# Patient Record
Sex: MALE | Race: ASIAN | NOT HISPANIC OR LATINO | URBAN - METROPOLITAN AREA
[De-identification: names, ages, dates, MRNs, and addresses within clinical notes are randomized per-mention and may not be internally consistent; named-entity substitution may affect disease eponyms.]

---

## 2018-06-12 ENCOUNTER — OFFICE VISIT (OUTPATIENT)
Dept: ENDOCRINOLOGY | Age: 53
End: 2018-06-12

## 2018-06-12 VITALS
HEIGHT: 71 IN | RESPIRATION RATE: 16 BRPM | DIASTOLIC BLOOD PRESSURE: 70 MMHG | BODY MASS INDEX: 28.03 KG/M2 | WEIGHT: 200.2 LBS | SYSTOLIC BLOOD PRESSURE: 112 MMHG

## 2018-06-12 DIAGNOSIS — I10 ESSENTIAL HYPERTENSION: ICD-10-CM

## 2018-06-12 DIAGNOSIS — E11.9 TYPE 2 DIABETES MELLITUS WITHOUT COMPLICATION, WITHOUT LONG-TERM CURRENT USE OF INSULIN (HCC): Primary | ICD-10-CM

## 2018-06-12 DIAGNOSIS — E78.5 ATHEROGENIC DYSLIPIDEMIA: ICD-10-CM

## 2018-06-12 PROCEDURE — 99204 OFFICE O/P NEW MOD 45 MIN: CPT | Performed by: INTERNAL MEDICINE

## 2018-06-12 RX ORDER — LANCETS 33 GAUGE
EACH MISCELLANEOUS
Qty: 100 EACH | Refills: 5 | Status: SHIPPED | OUTPATIENT
Start: 2018-06-12 | End: 2018-12-14

## 2018-06-12 RX ORDER — FENOFIBRATE 145 MG/1
145 TABLET, COATED ORAL DAILY
Qty: 30 TABLET | Refills: 5 | Status: SHIPPED | OUTPATIENT
Start: 2018-06-12 | End: 2018-12-14

## 2018-06-12 RX ORDER — GLYBURIDE 5 MG/1
TABLET ORAL
Refills: 0 | COMMUNITY
Start: 2018-05-18 | End: 2018-06-13

## 2018-06-12 RX ORDER — PIOGLITAZONEHYDROCHLORIDE 30 MG/1
30 TABLET ORAL DAILY
Qty: 30 TABLET | Refills: 11 | Status: SHIPPED | OUTPATIENT
Start: 2018-06-12 | End: 2018-12-14 | Stop reason: SDUPTHER

## 2018-06-12 RX ORDER — ERTUGLIFLOZIN 15 MG/1
15 TABLET, FILM COATED ORAL EVERY MORNING
Qty: 30 TABLET | Refills: 5 | Status: SHIPPED | OUTPATIENT
Start: 2018-06-12 | End: 2018-09-06

## 2018-06-12 RX ORDER — RAMIPRIL 5 MG/1
CAPSULE ORAL
Refills: 2 | COMMUNITY
Start: 2018-04-04 | End: 2018-12-14 | Stop reason: SDUPTHER

## 2018-06-12 RX ORDER — BLOOD SUGAR DIAGNOSTIC
STRIP MISCELLANEOUS
Qty: 100 EACH | Refills: 5 | Status: SHIPPED | OUTPATIENT
Start: 2018-06-12 | End: 2018-12-18 | Stop reason: SDUPTHER

## 2018-06-12 RX ORDER — SITAGLIPTIN AND METFORMIN HYDROCHLORIDE 1000; 50 MG/1; MG/1
2 TABLET, FILM COATED, EXTENDED RELEASE ORAL
Qty: 60 TABLET | Refills: 5 | Status: SHIPPED | OUTPATIENT
Start: 2018-06-12 | End: 2018-12-14 | Stop reason: SDUPTHER

## 2018-06-12 RX ORDER — ROSUVASTATIN CALCIUM 40 MG/1
40 TABLET, COATED ORAL DAILY
Qty: 30 TABLET | Refills: 11 | Status: SHIPPED | OUTPATIENT
Start: 2018-06-12 | End: 2018-12-14

## 2018-06-12 NOTE — PROGRESS NOTES
"Ketty Sepulveda is a 53 y.o. male seen as a new patient for DM2. He is checking BG every other day. He states that fasting his BG is running 250. He states that he is having burning in his feet x 2 months. He states that he is having back pain while he is sleeping.    History of Present Illness     /70   Resp 16   Ht 180.3 cm (71\")   Wt 90.8 kg (200 lb 3.2 oz)   BMI 27.92 kg/m²      No Known Allergies    Current Outpatient Prescriptions:   •  glyBURIDE (DIAbeta) 5 MG tablet, TK 2 TS PO BID, Disp: , Rfl: 0  •  metFORMIN (GLUCOPHAGE) 1000 MG tablet, TK 1 T PO BID, Disp: , Rfl: 2  •  ramipril (ALTACE) 5 MG capsule, TK 2 CS PO QAM AND 1 C QPM, Disp: , Rfl: 2    The following portions of the patient's history were reviewed and updated as appropriate: allergies, current medications, past family history, past medical history, past social history, past surgical history and problem list.    Review of Systems   Constitutional: Negative.    HENT: Negative.    Eyes: Negative.    Respiratory: Negative.    Cardiovascular: Negative.    Gastrointestinal: Negative.    Endocrine: Negative.    Genitourinary: Negative.    Musculoskeletal: Positive for back pain (lower back pain while sleeping).   Skin: Negative.    Allergic/Immunologic: Negative.    Neurological: Negative.    Hematological: Negative.    Psychiatric/Behavioral: Negative.        Objective   Physical Exam   Constitutional: He is oriented to person, place, and time. He appears well-developed and well-nourished. No distress.   HENT:   Head: Normocephalic and atraumatic.   Right Ear: External ear normal.   Left Ear: External ear normal.   Nose: Nose normal.   Mouth/Throat: Oropharynx is clear and moist.   Eyes: Conjunctivae and EOM are normal. Pupils are equal, round, and reactive to light. Right eye exhibits no discharge. No scleral icterus.   Neck: Normal range of motion. Neck supple. No JVD present. No tracheal deviation present. No thyromegaly " present.   Cardiovascular: Normal rate, regular rhythm, normal heart sounds and intact distal pulses.  Exam reveals no gallop and no friction rub.    No murmur heard.  Pulmonary/Chest: Effort normal and breath sounds normal. No respiratory distress. He has no wheezes. He has no rales. He exhibits no tenderness.   Abdominal: Soft. Bowel sounds are normal. He exhibits no distension and no mass. There is no tenderness. There is no rebound and no guarding. No hernia.   Musculoskeletal: Normal range of motion. He exhibits no edema, tenderness or deformity.   Lymphadenopathy:     He has no cervical adenopathy.   Neurological: He is alert and oriented to person, place, and time. He displays normal reflexes. No cranial nerve deficit or sensory deficit. He exhibits normal muscle tone. Coordination normal.   Skin: Skin is warm and dry. No rash noted. He is not diaphoretic. No erythema. No pallor.   Acanthosis nigricans around the lower part of his face and around his neck more prominent anteriorly.   Psychiatric: He has a normal mood and affect. His behavior is normal. Judgment and thought content normal.   Nursing note and vitals reviewed.        Assessment/Plan   Diagnoses and all orders for this visit:    Type 2 diabetes mellitus without complication, without long-term current use of insulin  -     T3, Free  -     T4 & TSH ([facility])  -     T4, Free  -     Uric Acid  -     Vitamin D 25 Hydroxy  -     Comprehensive Metabolic Panel  -     C-Peptide  -     Hemoglobin A1c  -     Lipid Panel  -     MicroAlbumin, Urine, Random - Urine, Clean Catch  -     ACTH  -     Cortisol  -     T4 & TSH ([facility]); Future  -     Uric Acid; Future  -     Vitamin D 25 Hydroxy; Future  -     Comprehensive Metabolic Panel; Future  -     C-Peptide; Future  -     Hemoglobin A1c; Future  -     Lipid Panel; Future  -     MicroAlbumin, Urine, Random - Urine, Clean Catch; Future    Essential hypertension  -     T3, Free  -     T4 & TSH ([facility])  -      T4, Free  -     Uric Acid  -     Vitamin D 25 Hydroxy  -     Comprehensive Metabolic Panel  -     C-Peptide  -     Hemoglobin A1c  -     Lipid Panel  -     MicroAlbumin, Urine, Random - Urine, Clean Catch  -     ACTH  -     Cortisol  -     T4 & TSH ([facility]); Future  -     Uric Acid; Future  -     Vitamin D 25 Hydroxy; Future  -     Comprehensive Metabolic Panel; Future  -     C-Peptide; Future  -     Hemoglobin A1c; Future  -     Lipid Panel; Future  -     MicroAlbumin, Urine, Random - Urine, Clean Catch; Future    Atherogenic dyslipidemia  -     T3, Free  -     T4 & TSH ([facility])  -     T4, Free  -     Uric Acid  -     Vitamin D 25 Hydroxy  -     Comprehensive Metabolic Panel  -     C-Peptide  -     Hemoglobin A1c  -     Lipid Panel  -     MicroAlbumin, Urine, Random - Urine, Clean Catch  -     ACTH  -     Cortisol  -     T4 & TSH ([facility]); Future  -     Uric Acid; Future  -     Vitamin D 25 Hydroxy; Future  -     Comprehensive Metabolic Panel; Future  -     C-Peptide; Future  -     Hemoglobin A1c; Future  -     Lipid Panel; Future  -     MicroAlbumin, Urine, Random - Urine, Clean Catch; Future    Other orders  -     SCANNED - LABS  -     JANUMET XR  MG tablet; Take 2 tablets by mouth Daily With Dinner.  -     STEGLATRO 15 MG tablet; Take 1 tablet by mouth Every Morning.  -     pioglitazone (ACTOS) 30 MG tablet; Take 1 tablet by mouth Daily.  -     rosuvastatin (CRESTOR) 40 MG tablet; Take 1 tablet by mouth Daily.  -     fenofibrate (TRICOR) 145 MG tablet; Take 1 tablet by mouth Daily.  -     ONETOUCH VERIO test strip; Check blood glucose 3 Times daily Use as instructed  -     ONETOUCH DELICA LANCETS 33G misc; Blood glucose 3 Times daily             in summary I saw and examined this 53-year-old gentleman for above-mentioned problems.  I reviewed these and laboratory evaluations of the 04/04/2018 as well as 10/12/2017 both of which demonstrate hemoglobin A1c of greater than 8.0 indicating poor diabetic  control.  He also has significantly elevated levels of triglyceride and cholesterol as well as LDL.  We will go ahead and order an extensive laboratory evaluation and once the results come back we will go ahead and call for any possible modification or new medications or further evaluations.  At this time however I am is starting him on Janumet 50/1000 mg 2 tablets with supper and Steglatro 5 mg every morning for 30 days and after that move up to 15 mg every morning.  I also started him on Actos 30 mg daily as well as Crestor 40 mg daily and fenofibrate 145 mg daily.  He will see MsChanning Gina Revees in 4 months or sooner if needed with laboratory evaluation prior to each office visit.

## 2018-06-13 LAB
25(OH)D3+25(OH)D2 SERPL-MCNC: 17.1 NG/ML (ref 30–100)
ACTH PLAS-MCNC: 19.1 PG/ML (ref 7.2–63.3)
ALBUMIN SERPL-MCNC: 4.6 G/DL (ref 3.5–5.2)
ALBUMIN/GLOB SERPL: 1.3 G/DL
ALP SERPL-CCNC: 83 U/L (ref 39–117)
ALT SERPL-CCNC: 44 U/L (ref 1–41)
AST SERPL-CCNC: 18 U/L (ref 1–40)
BILIRUB SERPL-MCNC: 0.4 MG/DL (ref 0.1–1.2)
BUN SERPL-MCNC: 12 MG/DL (ref 6–20)
BUN/CREAT SERPL: 15.6 (ref 7–25)
C PEPTIDE SERPL-MCNC: 3.8 NG/ML (ref 1.1–4.4)
CALCIUM SERPL-MCNC: 9.4 MG/DL (ref 8.6–10.5)
CHLORIDE SERPL-SCNC: 96 MMOL/L (ref 98–107)
CHOLEST SERPL-MCNC: 211 MG/DL (ref 0–200)
CO2 SERPL-SCNC: 26.9 MMOL/L (ref 22–29)
CORTIS SERPL-MCNC: 15.5 UG/DL
CREAT SERPL-MCNC: 0.77 MG/DL (ref 0.76–1.27)
GFR SERPLBLD CREATININE-BSD FMLA CKD-EPI: 105 ML/MIN/1.73
GFR SERPLBLD CREATININE-BSD FMLA CKD-EPI: 128 ML/MIN/1.73
GLOBULIN SER CALC-MCNC: 3.5 GM/DL
GLUCOSE SERPL-MCNC: 311 MG/DL (ref 65–99)
HBA1C MFR BLD: 10.7 % (ref 4.8–5.6)
HDLC SERPL-MCNC: 43 MG/DL (ref 40–60)
INTERPRETATION: NORMAL
LDLC SERPL CALC-MCNC: 119 MG/DL (ref 0–100)
Lab: NORMAL
POTASSIUM SERPL-SCNC: 4.5 MMOL/L (ref 3.5–5.2)
PROT SERPL-MCNC: 8.1 G/DL (ref 6–8.5)
SODIUM SERPL-SCNC: 136 MMOL/L (ref 136–145)
T3FREE SERPL-MCNC: 3.6 PG/ML (ref 2–4.4)
T4 FREE SERPL-MCNC: 1.67 NG/DL (ref 0.93–1.7)
T4 SERPL-MCNC: 9.95 MCG/DL (ref 4.5–11.7)
TRIGL SERPL-MCNC: 247 MG/DL (ref 0–150)
TSH SERPL DL<=0.005 MIU/L-ACNC: 4.29 MIU/ML (ref 0.27–4.2)
URATE SERPL-MCNC: 4.4 MG/DL (ref 3.4–7)
VLDLC SERPL CALC-MCNC: 49.4 MG/DL (ref 5–40)

## 2018-06-13 RX ORDER — ERGOCALCIFEROL 1.25 MG/1
50000 CAPSULE ORAL 2 TIMES WEEKLY
Qty: 26 CAPSULE | Refills: 3 | Status: SHIPPED | OUTPATIENT
Start: 2018-06-14 | End: 2018-12-14 | Stop reason: SDUPTHER

## 2018-06-13 RX ORDER — LEVOTHYROXINE SODIUM 75 UG/1
75 TABLET ORAL DAILY
Qty: 30 TABLET | Refills: 5 | Status: SHIPPED | OUTPATIENT
Start: 2018-06-13 | End: 2018-12-14

## 2018-09-06 RX ORDER — DAPAGLIFLOZIN 10 MG/1
TABLET, FILM COATED ORAL
Qty: 30 TABLET | Refills: 5 | Status: SHIPPED | OUTPATIENT
Start: 2018-09-06 | End: 2018-12-14

## 2018-09-07 ENCOUNTER — TELEPHONE (OUTPATIENT)
Dept: ENDOCRINOLOGY | Age: 53
End: 2018-09-07

## 2018-09-07 NOTE — TELEPHONE ENCOUNTER
[facility] AND WAS APPROVED ON 9/10/18

## 2018-09-24 DIAGNOSIS — E11.9 TYPE 2 DIABETES MELLITUS WITHOUT COMPLICATION, WITHOUT LONG-TERM CURRENT USE OF INSULIN (HCC): Primary | ICD-10-CM

## 2018-09-24 DIAGNOSIS — E03.8 SECONDARY HYPOTHYROIDISM: ICD-10-CM

## 2018-09-24 DIAGNOSIS — E78.5 ATHEROGENIC DYSLIPIDEMIA: ICD-10-CM

## 2018-09-24 DIAGNOSIS — E55.9 MILD VITAMIN D DEFICIENCY: ICD-10-CM

## 2018-10-02 ENCOUNTER — LAB (OUTPATIENT)
Dept: ENDOCRINOLOGY | Age: 53
End: 2018-10-02

## 2018-10-02 DIAGNOSIS — E55.9 MILD VITAMIN D DEFICIENCY: ICD-10-CM

## 2018-10-02 DIAGNOSIS — E78.5 ATHEROGENIC DYSLIPIDEMIA: ICD-10-CM

## 2018-10-02 DIAGNOSIS — E03.8 SECONDARY HYPOTHYROIDISM: ICD-10-CM

## 2018-10-02 DIAGNOSIS — E11.9 TYPE 2 DIABETES MELLITUS WITHOUT COMPLICATION, WITHOUT LONG-TERM CURRENT USE OF INSULIN (HCC): ICD-10-CM

## 2018-10-03 ENCOUNTER — RESULTS ENCOUNTER (OUTPATIENT)
Dept: ENDOCRINOLOGY | Age: 53
End: 2018-10-03

## 2018-10-03 DIAGNOSIS — E78.5 ATHEROGENIC DYSLIPIDEMIA: ICD-10-CM

## 2018-10-03 DIAGNOSIS — E11.9 TYPE 2 DIABETES MELLITUS WITHOUT COMPLICATION, WITHOUT LONG-TERM CURRENT USE OF INSULIN (HCC): ICD-10-CM

## 2018-10-03 DIAGNOSIS — I10 ESSENTIAL HYPERTENSION: ICD-10-CM

## 2018-10-03 LAB
25(OH)D3+25(OH)D2 SERPL-MCNC: 23 NG/ML (ref 30–100)
ALBUMIN SERPL-MCNC: 4.6 G/DL (ref 3.5–5.2)
ALBUMIN/GLOB SERPL: 1.4 G/DL
ALP SERPL-CCNC: 71 U/L (ref 39–117)
ALT SERPL-CCNC: 27 U/L (ref 1–41)
AST SERPL-CCNC: 14 U/L (ref 1–40)
BILIRUB SERPL-MCNC: 0.3 MG/DL (ref 0.1–1.2)
BUN SERPL-MCNC: 13 MG/DL (ref 6–20)
BUN/CREAT SERPL: 13.4 (ref 7–25)
C PEPTIDE SERPL-MCNC: 2.2 NG/ML (ref 1.1–4.4)
CALCIUM SERPL-MCNC: 9.6 MG/DL (ref 8.6–10.5)
CHLORIDE SERPL-SCNC: 101 MMOL/L (ref 98–107)
CHOLEST SERPL-MCNC: 229 MG/DL (ref 0–200)
CO2 SERPL-SCNC: 25 MMOL/L (ref 22–29)
CREAT SERPL-MCNC: 0.97 MG/DL (ref 0.76–1.27)
FT4I SERPL CALC-MCNC: 2.2 (ref 1.2–4.9)
GLOBULIN SER CALC-MCNC: 3.4 GM/DL
GLUCOSE SERPL-MCNC: 234 MG/DL (ref 65–99)
HBA1C MFR BLD: 9.5 % (ref 4.8–5.6)
HDLC SERPL-MCNC: 44 MG/DL (ref 40–60)
INTERPRETATION: NORMAL
LDLC SERPL CALC-MCNC: 144 MG/DL (ref 0–100)
Lab: NORMAL
MICROALBUMIN UR-MCNC: 20 UG/ML
POTASSIUM SERPL-SCNC: 4.9 MMOL/L (ref 3.5–5.2)
PROT SERPL-MCNC: 8 G/DL (ref 6–8.5)
SODIUM SERPL-SCNC: 140 MMOL/L (ref 136–145)
T3FREE SERPL-MCNC: 3 PG/ML (ref 2–4.4)
T3RU NFR SERPL: 27 % (ref 24–39)
T4 FREE SERPL-MCNC: 1.38 NG/DL (ref 0.93–1.7)
T4 SERPL-MCNC: 8.3 UG/DL (ref 4.5–12)
TRIGL SERPL-MCNC: 206 MG/DL (ref 0–150)
TSH SERPL DL<=0.005 MIU/L-ACNC: 1.5 UIU/ML (ref 0.45–4.5)
VLDLC SERPL CALC-MCNC: 41.2 MG/DL (ref 5–40)

## 2018-12-01 LAB
25(OH)D3+25(OH)D2 SERPL-MCNC: 20.6 NG/ML (ref 30–100)
ALBUMIN SERPL-MCNC: 4.6 G/DL (ref 3.5–5.2)
ALBUMIN/GLOB SERPL: 1.5 G/DL
ALP SERPL-CCNC: 66 U/L (ref 39–117)
ALT SERPL-CCNC: 31 U/L (ref 1–41)
AST SERPL-CCNC: 21 U/L (ref 1–40)
BILIRUB SERPL-MCNC: 0.3 MG/DL (ref 0.1–1.2)
BUN SERPL-MCNC: 11 MG/DL (ref 6–20)
BUN/CREAT SERPL: 13.8 (ref 7–25)
C PEPTIDE SERPL-MCNC: 2.7 NG/ML (ref 1.1–4.4)
CALCIUM SERPL-MCNC: 9.5 MG/DL (ref 8.6–10.5)
CHLORIDE SERPL-SCNC: 100 MMOL/L (ref 98–107)
CHOLEST SERPL-MCNC: 234 MG/DL (ref 0–200)
CO2 SERPL-SCNC: 22.4 MMOL/L (ref 22–29)
CREAT SERPL-MCNC: 0.8 MG/DL (ref 0.76–1.27)
GLOBULIN SER CALC-MCNC: 3.1 GM/DL
GLUCOSE SERPL-MCNC: 204 MG/DL (ref 65–99)
HBA1C MFR BLD: 9.1 % (ref 4.8–5.6)
HDLC SERPL-MCNC: 45 MG/DL (ref 40–60)
INTERPRETATION: NORMAL
LDLC SERPL CALC-MCNC: 131 MG/DL (ref 0–100)
Lab: NORMAL
POTASSIUM SERPL-SCNC: 4.4 MMOL/L (ref 3.5–5.2)
PROT SERPL-MCNC: 7.7 G/DL (ref 6–8.5)
SODIUM SERPL-SCNC: 137 MMOL/L (ref 136–145)
T4 SERPL-MCNC: 8.71 MCG/DL (ref 4.5–11.7)
TRIGL SERPL-MCNC: 290 MG/DL (ref 0–150)
TSH SERPL DL<=0.005 MIU/L-ACNC: 2.86 MIU/ML (ref 0.27–4.2)
URATE SERPL-MCNC: 4.7 MG/DL (ref 3.4–7)
VLDLC SERPL CALC-MCNC: 58 MG/DL (ref 5–40)

## 2018-12-14 ENCOUNTER — OFFICE VISIT (OUTPATIENT)
Dept: ENDOCRINOLOGY | Age: 53
End: 2018-12-14

## 2018-12-14 VITALS
HEIGHT: 71 IN | DIASTOLIC BLOOD PRESSURE: 72 MMHG | SYSTOLIC BLOOD PRESSURE: 116 MMHG | WEIGHT: 195.4 LBS | HEART RATE: 75 BPM | BODY MASS INDEX: 27.35 KG/M2

## 2018-12-14 DIAGNOSIS — I10 ESSENTIAL HYPERTENSION: ICD-10-CM

## 2018-12-14 DIAGNOSIS — E55.9 VITAMIN D DEFICIENCY: ICD-10-CM

## 2018-12-14 DIAGNOSIS — E11.9 TYPE 2 DIABETES MELLITUS WITHOUT COMPLICATION, WITHOUT LONG-TERM CURRENT USE OF INSULIN (HCC): Primary | ICD-10-CM

## 2018-12-14 DIAGNOSIS — E78.5 ATHEROGENIC DYSLIPIDEMIA: ICD-10-CM

## 2018-12-14 DIAGNOSIS — R68.82 DECREASED LIBIDO: ICD-10-CM

## 2018-12-14 PROCEDURE — 99215 OFFICE O/P EST HI 40 MIN: CPT | Performed by: INTERNAL MEDICINE

## 2018-12-14 RX ORDER — ERTUGLIFLOZIN 15 MG/1
TABLET, FILM COATED ORAL
Qty: 90 TABLET | Refills: 3 | Status: SHIPPED | OUTPATIENT
Start: 2018-12-14 | End: 2019-08-14 | Stop reason: SDDI

## 2018-12-14 RX ORDER — FENOFIBRATE 145 MG/1
145 TABLET, COATED ORAL DAILY
Qty: 90 TABLET | Refills: 3 | Status: SHIPPED | OUTPATIENT
Start: 2018-12-14 | End: 2019-08-14 | Stop reason: SDDI

## 2018-12-14 RX ORDER — SITAGLIPTIN AND METFORMIN HYDROCHLORIDE 1000; 50 MG/1; MG/1
2 TABLET, FILM COATED, EXTENDED RELEASE ORAL
Qty: 180 TABLET | Refills: 3 | Status: SHIPPED | OUTPATIENT
Start: 2018-12-14 | End: 2019-08-14 | Stop reason: SDDI

## 2018-12-14 RX ORDER — NATEGLINIDE 120 MG/1
120 TABLET ORAL
Qty: 270 TABLET | Refills: 3 | Status: SHIPPED | OUTPATIENT
Start: 2018-12-14 | End: 2019-06-17

## 2018-12-14 RX ORDER — ERTUGLIFLOZIN 15 MG/1
TABLET, FILM COATED ORAL
Refills: 5 | COMMUNITY
Start: 2018-11-26 | End: 2018-12-14 | Stop reason: SDUPTHER

## 2018-12-14 RX ORDER — BLOOD-GLUCOSE METER
KIT MISCELLANEOUS
Qty: 200 EACH | Refills: 3 | Status: SHIPPED | OUTPATIENT
Start: 2018-12-14 | End: 2018-12-18 | Stop reason: CLARIF

## 2018-12-14 RX ORDER — RAMIPRIL 5 MG/1
CAPSULE ORAL
Qty: 270 CAPSULE | Refills: 3 | Status: SHIPPED | OUTPATIENT
Start: 2018-12-14 | End: 2019-08-14 | Stop reason: SDDI

## 2018-12-14 RX ORDER — ERGOCALCIFEROL 1.25 MG/1
50000 CAPSULE ORAL 3 TIMES WEEKLY
Qty: 39 CAPSULE | Refills: 3 | Status: SHIPPED | OUTPATIENT
Start: 2018-12-14 | End: 2019-08-14 | Stop reason: SDUPTHER

## 2018-12-14 RX ORDER — BLOOD-GLUCOSE METER
KIT MISCELLANEOUS
Qty: 1 EACH | Refills: 0 | Status: SHIPPED | OUTPATIENT
Start: 2018-12-14 | End: 2018-12-18 | Stop reason: CLARIF

## 2018-12-14 RX ORDER — PIOGLITAZONEHYDROCHLORIDE 30 MG/1
30 TABLET ORAL DAILY
Qty: 90 TABLET | Refills: 3 | Status: SHIPPED | OUTPATIENT
Start: 2018-12-14 | End: 2019-04-02

## 2018-12-14 RX ORDER — FLASH GLUCOSE SENSOR
1 KIT MISCELLANEOUS AS NEEDED
Qty: 6 EACH | Refills: 3 | Status: SHIPPED | OUTPATIENT
Start: 2018-12-14 | End: 2018-12-18 | Stop reason: CLARIF

## 2018-12-14 RX ORDER — LANCETS 28 GAUGE
EACH MISCELLANEOUS
Qty: 200 EACH | Refills: 3 | Status: SHIPPED | OUTPATIENT
Start: 2018-12-14 | End: 2018-12-18 | Stop reason: CLARIF

## 2018-12-14 NOTE — PROGRESS NOTES
"Ketty Sepulveda is a 53 y.o. male     F/u type 2 diabetes.  Lab review.  History of Present Illness this is a 53-year-old gentleman known patient with type II diabetes hypertension and dyslipidemia as well as vitamin D deficiency.  Over the course of last 6 months he has had no significant health problem for which to go to the emergency room or hospital.  And says the reason for stopping fenofibrate and Crestor was that he ran out and had no more refills.  And as he was getting ready to leave the office she mentioned that he does have diminished sexual desire and wanted to be checked for low testosterone.  /72   Pulse 75   Ht 180.3 cm (71\")   Wt 88.6 kg (195 lb 6.4 oz)   BMI 27.25 kg/m²    No Known Allergies     Current Outpatient Medications:   •  ergocalciferol (ERGOCALCIFEROL) 28713 units capsule, Take 1 capsule by mouth 2 (Two) Times a Week., Disp: 26 capsule, Rfl: 3  •  JANUMET XR  MG tablet, Take 2 tablets by mouth Daily With Dinner., Disp: 60 tablet, Rfl: 5  •  ONETOUCH DELICA LANCETS 33G misc, Blood glucose 3 Times daily, Disp: 100 each, Rfl: 5  •  ONETOUCH VERIO test strip, Check blood glucose 3 Times daily Use as instructed, Disp: 100 each, Rfl: 5  •  pioglitazone (ACTOS) 30 MG tablet, Take 1 tablet by mouth Daily. (Patient taking differently: Take 30 mg by mouth As Needed.), Disp: 30 tablet, Rfl: 11  •  ramipril (ALTACE) 5 MG capsule, TK 2 CS PO QAM AND 1 C QPM, Disp: , Rfl: 2  •  STEGLATRO 15 MG tablet, TK 1 T PO QAM, Disp: , Rfl: 5  The following portions of the patient's history were reviewed and updated as appropriate: allergies, current medications, past family history, past medical history, past social history, past surgical history and problem list.    Review of Systems   Constitutional: Negative for chills, fatigue and fever.   Cardiovascular: Positive for palpitations. Negative for chest pain.   Gastrointestinal: Negative for abdominal pain, constipation, diarrhea, " nausea and vomiting.   Endocrine: Positive for cold intolerance. Negative for heat intolerance.       Objective   Physical Exam   Constitutional: He is oriented to person, place, and time. He appears well-developed and well-nourished. No distress.   HENT:   Head: Normocephalic and atraumatic.   Right Ear: External ear normal.   Left Ear: External ear normal.   Nose: Nose normal.   Mouth/Throat: Oropharynx is clear and moist. No oropharyngeal exudate.   Eyes: Conjunctivae and EOM are normal. Pupils are equal, round, and reactive to light. Right eye exhibits no discharge. Left eye exhibits no discharge. No scleral icterus.   Neck: Normal range of motion. Neck supple. No JVD present. No tracheal deviation present. No thyromegaly present.   Cardiovascular: Normal rate, regular rhythm, normal heart sounds and intact distal pulses. Exam reveals no gallop and no friction rub.   No murmur heard.  Pulmonary/Chest: Effort normal and breath sounds normal. No stridor. No respiratory distress. He has no wheezes. He has no rales. He exhibits no tenderness.   Abdominal: Soft. Bowel sounds are normal. He exhibits no distension and no mass. There is no tenderness. There is no rebound and no guarding. No hernia.   Musculoskeletal: Normal range of motion. He exhibits no edema, tenderness or deformity.   Lymphadenopathy:     He has no cervical adenopathy.   Neurological: He is alert and oriented to person, place, and time. He displays normal reflexes. No cranial nerve deficit or sensory deficit. He exhibits normal muscle tone. Coordination normal.   Skin: Skin is warm and dry. No rash noted. He is not diaphoretic. No erythema. No pallor.   Acanthosis nigricans around the lower part of his face and around his neck more prominent anteriorly.   Psychiatric: He has a normal mood and affect. His behavior is normal. Judgment and thought content normal.   Nursing note and vitals reviewed.      Results for orders placed or performed in visit  on 10/03/18   T4 & TSH ([facility])   Result Value Ref Range    TSH 2.860 0.270 - 4.200 mIU/mL    T4, Total 8.71 4.50 - 11.70 mcg/dL   Uric Acid   Result Value Ref Range    Uric Acid 4.7 3.4 - 7.0 mg/dL   Vitamin D 25 Hydroxy   Result Value Ref Range    25 Hydroxy, Vitamin D 20.6 (L) 30.0 - 100.0 ng/ml   Comprehensive Metabolic Panel   Result Value Ref Range    Glucose 204 (H) 65 - 99 mg/dL    BUN 11 6 - 20 mg/dL    Creatinine 0.80 0.76 - 1.27 mg/dL    eGFR Non African Am 101 >60 mL/min/1.73    eGFR African Am 122 >60 mL/min/1.73    BUN/Creatinine Ratio 13.8 7.0 - 25.0    Sodium 137 136 - 145 mmol/L    Potassium 4.4 3.5 - 5.2 mmol/L    Chloride 100 98 - 107 mmol/L    Total CO2 22.4 22.0 - 29.0 mmol/L    Calcium 9.5 8.6 - 10.5 mg/dL    Total Protein 7.7 6.0 - 8.5 g/dL    Albumin 4.60 3.50 - 5.20 g/dL    Globulin 3.1 gm/dL    A/G Ratio 1.5 g/dL    Total Bilirubin 0.3 0.1 - 1.2 mg/dL    Alkaline Phosphatase 66 39 - 117 U/L    AST (SGOT) 21 1 - 40 U/L    ALT (SGPT) 31 1 - 41 U/L   C-Peptide   Result Value Ref Range    C-Peptide 2.7 1.1 - 4.4 ng/mL   Hemoglobin A1c   Result Value Ref Range    Hemoglobin A1C 9.10 (H) 4.80 - 5.60 %   Lipid Panel   Result Value Ref Range    Total Cholesterol 234 (H) 0 - 200 mg/dL    Triglycerides 290 (H) 0 - 150 mg/dL    HDL Cholesterol 45 40 - 60 mg/dL    VLDL Cholesterol 58 (H) 5 - 40 mg/dL    LDL Cholesterol  131 (H) 0 - 100 mg/dL   Cardiovascular Risk Assessment   Result Value Ref Range    Interpretation Note    Diabetes Patient Education   Result Value Ref Range    PDF Image Not applicable      Assessment/Plan   Wyatt was seen today for diabetes.    Diagnoses and all orders for this visit:    Type 2 diabetes mellitus without complication, without long-term current use of insulin (CMS/[facility])  -     T3, Free; Future  -     T4 & TSH ([facility]); Future  -     T4, Free; Future  -     Thyroglobulin With Anti-TG; Future  -     Uric Acid; Future  -     Vitamin D 25 Hydroxy; Future  -      Comprehensive Metabolic Panel; Future  -     C-Peptide; Future  -     Hemoglobin A1c; Future  -     Lipid Panel; Future  -     MicroAlbumin, Urine, Random - Urine, Clean Catch; Future  -     TestT+TestF+SHBG  -     PSA DIAGNOSTIC  -     Hemoglobin & Hematocrit, Blood  -     TestT+TestF+SHBG; Future  -     PSA DIAGNOSTIC; Future  -     Hemoglobin & Hematocrit, Blood; Future    Atherogenic dyslipidemia  -     T3, Free; Future  -     T4 & TSH ([facility]); Future  -     T4, Free; Future  -     Thyroglobulin With Anti-TG; Future  -     Uric Acid; Future  -     Vitamin D 25 Hydroxy; Future  -     Comprehensive Metabolic Panel; Future  -     C-Peptide; Future  -     Hemoglobin A1c; Future  -     Lipid Panel; Future  -     MicroAlbumin, Urine, Random - Urine, Clean Catch; Future  -     TestT+TestF+SHBG  -     PSA DIAGNOSTIC  -     Hemoglobin & Hematocrit, Blood  -     TestT+TestF+SHBG; Future  -     PSA DIAGNOSTIC; Future  -     Hemoglobin & Hematocrit, Blood; Future    Essential hypertension  -     T3, Free; Future  -     T4 & TSH ([facility]); Future  -     T4, Free; Future  -     Thyroglobulin With Anti-TG; Future  -     Uric Acid; Future  -     Vitamin D 25 Hydroxy; Future  -     Comprehensive Metabolic Panel; Future  -     C-Peptide; Future  -     Hemoglobin A1c; Future  -     Lipid Panel; Future  -     MicroAlbumin, Urine, Random - Urine, Clean Catch; Future  -     TestT+TestF+SHBG  -     PSA DIAGNOSTIC  -     Hemoglobin & Hematocrit, Blood  -     TestT+TestF+SHBG; Future  -     PSA DIAGNOSTIC; Future  -     Hemoglobin & Hematocrit, Blood; Future    Vitamin D deficiency  -     T3, Free; Future  -     T4 & TSH ([facility]); Future  -     T4, Free; Future  -     Thyroglobulin With Anti-TG; Future  -     Uric Acid; Future  -     Vitamin D 25 Hydroxy; Future  -     Comprehensive Metabolic Panel; Future  -     C-Peptide; Future  -     Hemoglobin A1c; Future  -     Lipid Panel; Future  -     MicroAlbumin, Urine, Random - Urine,  Clean Catch; Future  -     TestT+TestF+SHBG  -     PSA DIAGNOSTIC  -     Hemoglobin & Hematocrit, Blood  -     TestT+TestF+SHBG; Future  -     PSA DIAGNOSTIC; Future  -     Hemoglobin & Hematocrit, Blood; Future    Decreased libido  -     TestT+TestF+SHBG  -     PSA DIAGNOSTIC  -     Hemoglobin & Hematocrit, Blood  -     TestT+TestF+SHBG; Future  -     PSA DIAGNOSTIC; Future  -     Hemoglobin & Hematocrit, Blood; Future    Other orders  -     fenofibrate (TRICOR) 145 MG tablet; Take 1 tablet by mouth Daily.  -     ramipril (ALTACE) 5 MG capsule; 2 tablets in the morning and one in the evening  -     STEGLATRO 15 MG tablet; Take 1 tablet every morning  -     JANUMET XR  MG tablet; Take 2 tablets by mouth Daily With Dinner.  -     pioglitazone (ACTOS) 30 MG tablet; Take 1 tablet by mouth Daily.  -     nateglinide (STARLIX) 120 MG tablet; Take 1 tablet by mouth 3 (Three) Times a Day Before Meals.  -     ergocalciferol (ERGOCALCIFEROL) 55326 units capsule; Take 1 capsule by mouth 3 (Three) Times a Week.  -     FREESTYLE LITE test strip; Check blood glucose twice daily and when needed  -     Lancets (FREESTYLE) lancets; Check blood glucose twice daily and when needed Use as instructed  -     Continuous Blood Gluc Sensor (FREESTYLE WALTER 14 DAY SENSOR) misc; 1 each As Needed (Use the device at least twice daily or when feeling blood sugar is too high or too low).  -     Blood Glucose Monitoring Suppl (FREESTYLE LITE) device; Use to test blood glucose 2 times and when needed.               In summary I saw and examined this 53-year-old gentleman for above-mentioned problems.  I reviewed his laboratory evaluations of 10/02/2018 as well as 11/30/2013.  His hemoglobin A1c is 9.10 and his lipids showed total cholesterol of 234 with a triglyceride 290 and LDL cholesterol 131 all of which are out of range.  His vitamin D level is also very low.  Vitamin D therapy as well as adding Starlix 120 mg prior to each meal.  I we  will also go ahead and workup he is a decreased libido for any possible testosterone deficiency.  This office visit lasted 45 minutes and 30 minutes of it was a spent on face-to-face patient counseling and education and convincing him about being compliant in taking his medications regularly.  He will see Ms. Gina Reeves in 4 months or sooner if needed with laboratory evaluation prior to each office visit.

## 2018-12-16 LAB
HCT VFR BLD AUTO: 41.3 % (ref 40.4–52.2)
HGB BLD-MCNC: 14.1 G/DL (ref 13.7–17.6)
PSA SERPL-MCNC: 0.87 NG/ML (ref 0–4)
SHBG SERPL-SCNC: 16.8 NMOL/L (ref 19.3–76.4)
TESTOST FREE SERPL-MCNC: 9.5 PG/ML (ref 7.2–24)
TESTOST SERPL-MCNC: 181 NG/DL (ref 264–916)

## 2018-12-18 RX ORDER — BLOOD-GLUCOSE METER
1 EACH MISCELLANEOUS DAILY
Qty: 1 KIT | Refills: 0 | Status: SHIPPED | OUTPATIENT
Start: 2018-12-18

## 2018-12-18 RX ORDER — BLOOD SUGAR DIAGNOSTIC
STRIP MISCELLANEOUS
Qty: 100 EACH | Refills: 5 | Status: SHIPPED | OUTPATIENT
Start: 2018-12-18 | End: 2019-08-14 | Stop reason: SDDI

## 2019-03-22 ENCOUNTER — LAB (OUTPATIENT)
Dept: ENDOCRINOLOGY | Age: 54
End: 2019-03-22

## 2019-03-22 DIAGNOSIS — E78.5 ATHEROGENIC DYSLIPIDEMIA: ICD-10-CM

## 2019-03-22 DIAGNOSIS — E11.9 TYPE 2 DIABETES MELLITUS WITHOUT COMPLICATION, WITHOUT LONG-TERM CURRENT USE OF INSULIN (HCC): ICD-10-CM

## 2019-03-22 DIAGNOSIS — R68.82 DECREASED LIBIDO: ICD-10-CM

## 2019-03-22 DIAGNOSIS — I10 ESSENTIAL HYPERTENSION: ICD-10-CM

## 2019-03-22 DIAGNOSIS — E55.9 VITAMIN D DEFICIENCY: ICD-10-CM

## 2019-03-25 LAB
25(OH)D3+25(OH)D2 SERPL-MCNC: 36.7 NG/ML (ref 30–100)
ALBUMIN SERPL-MCNC: 4.8 G/DL (ref 3.5–5.2)
ALBUMIN/GLOB SERPL: 1.6 G/DL
ALP SERPL-CCNC: 61 U/L (ref 39–117)
ALT SERPL-CCNC: 26 U/L (ref 1–41)
AST SERPL-CCNC: 15 U/L (ref 1–40)
BILIRUB SERPL-MCNC: 0.3 MG/DL (ref 0.2–1.2)
BUN SERPL-MCNC: 16 MG/DL (ref 6–20)
BUN/CREAT SERPL: 15.4 (ref 7–25)
C PEPTIDE SERPL-MCNC: 5.5 NG/ML (ref 1.1–4.4)
CALCIUM SERPL-MCNC: 9.4 MG/DL (ref 8.6–10.5)
CHLORIDE SERPL-SCNC: 96 MMOL/L (ref 98–107)
CHOLEST SERPL-MCNC: 231 MG/DL (ref 0–200)
CO2 SERPL-SCNC: 21.6 MMOL/L (ref 22–29)
CREAT SERPL-MCNC: 1.04 MG/DL (ref 0.76–1.27)
GLOBULIN SER CALC-MCNC: 3 GM/DL
GLUCOSE SERPL-MCNC: 237 MG/DL (ref 65–99)
HBA1C MFR BLD: 9.54 % (ref 4.8–5.6)
HCT VFR BLD AUTO: 43.7 % (ref 37.5–51)
HDLC SERPL-MCNC: 49 MG/DL (ref 40–60)
HGB BLD-MCNC: 13.9 G/DL (ref 13–17.7)
INTERPRETATION: NORMAL
LDLC SERPL CALC-MCNC: 141 MG/DL (ref 0–100)
Lab: NORMAL
MICROALBUMIN UR-MCNC: 12.1 UG/ML
POTASSIUM SERPL-SCNC: 4.5 MMOL/L (ref 3.5–5.2)
PROT SERPL-MCNC: 7.8 G/DL (ref 6–8.5)
PSA SERPL-MCNC: 0.75 NG/ML (ref 0–4)
SHBG SERPL-SCNC: 20.8 NMOL/L (ref 19.3–76.4)
SODIUM SERPL-SCNC: 133 MMOL/L (ref 136–145)
T3FREE SERPL-MCNC: 2.7 PG/ML (ref 2–4.4)
T4 FREE SERPL-MCNC: 1.5 NG/DL (ref 0.93–1.7)
T4 SERPL-MCNC: 9.11 MCG/DL (ref 4.5–11.7)
TESTOST FREE SERPL-MCNC: 6 PG/ML (ref 7.2–24)
TESTOST SERPL-MCNC: 165 NG/DL (ref 264–916)
THYROGLOB AB SERPL-ACNC: <1 IU/ML (ref 0–0.9)
THYROGLOB SERPL-MCNC: 6.9 NG/ML (ref 1.4–29.2)
TRIGL SERPL-MCNC: 203 MG/DL (ref 0–150)
TSH SERPL DL<=0.005 MIU/L-ACNC: 1.15 MIU/ML (ref 0.27–4.2)
URATE SERPL-MCNC: 4.4 MG/DL (ref 3.4–7)
VLDLC SERPL CALC-MCNC: 40.6 MG/DL (ref 5–40)

## 2019-04-02 ENCOUNTER — OFFICE VISIT (OUTPATIENT)
Dept: ENDOCRINOLOGY | Age: 54
End: 2019-04-02

## 2019-04-02 VITALS
SYSTOLIC BLOOD PRESSURE: 126 MMHG | WEIGHT: 194.6 LBS | DIASTOLIC BLOOD PRESSURE: 66 MMHG | BODY MASS INDEX: 27.24 KG/M2 | HEIGHT: 71 IN | RESPIRATION RATE: 16 BRPM

## 2019-04-02 DIAGNOSIS — R68.82 DECREASED LIBIDO: ICD-10-CM

## 2019-04-02 DIAGNOSIS — E55.9 VITAMIN D DEFICIENCY: ICD-10-CM

## 2019-04-02 DIAGNOSIS — I10 ESSENTIAL HYPERTENSION: ICD-10-CM

## 2019-04-02 DIAGNOSIS — E78.5 ATHEROGENIC DYSLIPIDEMIA: ICD-10-CM

## 2019-04-02 DIAGNOSIS — R79.89 LOW TESTOSTERONE: ICD-10-CM

## 2019-04-02 DIAGNOSIS — E11.9 TYPE 2 DIABETES MELLITUS WITHOUT COMPLICATION, WITHOUT LONG-TERM CURRENT USE OF INSULIN (HCC): Primary | ICD-10-CM

## 2019-04-02 PROCEDURE — 99214 OFFICE O/P EST MOD 30 MIN: CPT | Performed by: INTERNAL MEDICINE

## 2019-04-02 RX ORDER — TESTOSTERONE GEL, 1% 10 MG/G
100 GEL TRANSDERMAL DAILY
Qty: 60 TUBE | Refills: 5 | Status: SHIPPED | OUTPATIENT
Start: 2019-04-02 | End: 2019-04-11

## 2019-04-02 RX ORDER — ROSUVASTATIN CALCIUM 20 MG/1
20 TABLET, COATED ORAL NIGHTLY
Qty: 30 TABLET | Refills: 11 | Status: SHIPPED | OUTPATIENT
Start: 2019-04-02 | End: 2019-08-14 | Stop reason: SDDI

## 2019-04-02 RX ORDER — PIOGLITAZONEHYDROCHLORIDE 45 MG/1
45 TABLET ORAL DAILY
Qty: 90 TABLET | Refills: 3 | Status: SHIPPED | OUTPATIENT
Start: 2019-04-02 | End: 2019-08-14 | Stop reason: SDDI

## 2019-04-02 NOTE — PROGRESS NOTES
"Subjective   Wyatt Sepulveda is a 54 y.o. male seen for follow up for DM2, lab review. Patient states that in his hand he is feeling pain in his bones and numbness in his fingers. He is not checking his BG. He states that he has checked his BG around lunch and BG is running in the 130's.     History of Present Illness this is a 54-year-old gentleman known patient with type 2 diabetes and hypertension and vitamin D deficiency with dyslipidemia.  Over the course of last 6 months he has had no significant health problems for which to go to the ER or hospital.    /66   Resp 16   Ht 180.3 cm (71\")   Wt 88.3 kg (194 lb 9.6 oz)   BMI 27.14 kg/m²      No Known Allergies    Current Outpatient Medications:   •  Blood Glucose Monitoring Suppl (ONETOUCH VERIO FLEX SYSTEM) w/Device kit, 1 Device Daily., Disp: 1 kit, Rfl: 0  •  ergocalciferol (ERGOCALCIFEROL) 48375 units capsule, Take 1 capsule by mouth 3 (Three) Times a Week., Disp: 39 capsule, Rfl: 3  •  fenofibrate (TRICOR) 145 MG tablet, Take 1 tablet by mouth Daily., Disp: 90 tablet, Rfl: 3  •  JANUMET XR  MG tablet, Take 2 tablets by mouth Daily With Dinner., Disp: 180 tablet, Rfl: 3  •  nateglinide (STARLIX) 120 MG tablet, Take 1 tablet by mouth 3 (Three) Times a Day Before Meals., Disp: 270 tablet, Rfl: 3  •  ONETOUCH VERIO test strip, Check blood glucose 3 Times daily Use as instructed, Disp: 100 each, Rfl: 5  •  pioglitazone (ACTOS) 30 MG tablet, Take 1 tablet by mouth Daily., Disp: 90 tablet, Rfl: 3  •  ramipril (ALTACE) 5 MG capsule, 2 tablets in the morning and one in the evening, Disp: 270 capsule, Rfl: 3  •  STEGLATRO 15 MG tablet, Take 1 tablet every morning, Disp: 90 tablet, Rfl: 3      The following portions of the patient's history were reviewed and updated as appropriate: allergies, current medications, past family history, past medical history, past social history, past surgical history and problem list.    Review of Systems   Constitutional: " Negative.    HENT: Negative.    Eyes: Negative.    Respiratory: Negative.    Cardiovascular: Negative.    Gastrointestinal: Negative.    Endocrine: Negative.    Genitourinary: Negative.    Musculoskeletal: Negative.    Skin: Negative.    Allergic/Immunologic: Negative.    Neurological: Negative.    Hematological: Negative.    Psychiatric/Behavioral: Negative.        Objective   Physical Exam   Constitutional: He is oriented to person, place, and time. He appears well-developed and well-nourished. No distress.   HENT:   Head: Normocephalic and atraumatic.   Right Ear: External ear normal.   Left Ear: External ear normal.   Nose: Nose normal.   Mouth/Throat: Oropharynx is clear and moist. No oropharyngeal exudate.   Eyes: Conjunctivae and EOM are normal. Pupils are equal, round, and reactive to light. Right eye exhibits no discharge. Left eye exhibits no discharge. No scleral icterus.   Neck: Normal range of motion. Neck supple. No JVD present. No tracheal deviation present. No thyromegaly present.   Cardiovascular: Normal rate, regular rhythm, normal heart sounds and intact distal pulses. Exam reveals no gallop and no friction rub.   No murmur heard.  Pulmonary/Chest: Effort normal and breath sounds normal. No stridor. No respiratory distress. He has no wheezes. He has no rales. He exhibits no tenderness.   Abdominal: Soft. Bowel sounds are normal. He exhibits no distension and no mass. There is no tenderness. There is no rebound and no guarding. No hernia.   Musculoskeletal: Normal range of motion. He exhibits no edema, tenderness or deformity.   Lymphadenopathy:     He has no cervical adenopathy.   Neurological: He is alert and oriented to person, place, and time. He displays normal reflexes. No cranial nerve deficit or sensory deficit. He exhibits normal muscle tone. Coordination normal.   Skin: Skin is warm and dry. No rash noted. He is not diaphoretic. No erythema. No pallor.   Acanthosis nigricans around the  lower part of his face and around his neck more prominent anteriorly.   Psychiatric: He has a normal mood and affect. His behavior is normal. Judgment and thought content normal.   Nursing note and vitals reviewed.       Results for orders placed or performed in visit on 03/22/19   PSA DIAGNOSTIC   Result Value Ref Range    PSA 0.749 0.000 - 4.000 ng/mL   Hemoglobin & Hematocrit, Blood   Result Value Ref Range    Hemoglobin 13.9 13.0 - 17.7 g/dL    Hematocrit 43.7 37.5 - 51.0 %   TestT+TestF+SHBG   Result Value Ref Range    Testosterone, Total 165 (L) 264 - 916 ng/dL    Testosterone, Free 6.0 (L) 7.2 - 24.0 pg/mL    Sex Hormone Binding Globulin 20.8 19.3 - 76.4 nmol/L   MicroAlbumin, Urine, Random - Urine, Clean Catch   Result Value Ref Range    Microalbumin, Urine 12.1 Not Estab. ug/mL   Lipid Panel   Result Value Ref Range    Total Cholesterol 231 (H) 0 - 200 mg/dL    Triglycerides 203 (H) 0 - 150 mg/dL    HDL Cholesterol 49 40 - 60 mg/dL    VLDL Cholesterol 40.6 (H) 5 - 40 mg/dL    LDL Cholesterol  141 (H) 0 - 100 mg/dL   Hemoglobin A1c   Result Value Ref Range    Hemoglobin A1C 9.54 (H) 4.80 - 5.60 %   C-Peptide   Result Value Ref Range    C-Peptide 5.5 (H) 1.1 - 4.4 ng/mL   Comprehensive Metabolic Panel   Result Value Ref Range    Glucose 237 (H) 65 - 99 mg/dL    BUN 16 6 - 20 mg/dL    Creatinine 1.04 0.76 - 1.27 mg/dL    eGFR Non African Am 74 >60 mL/min/1.73    eGFR African Am 90 >60 mL/min/1.73    BUN/Creatinine Ratio 15.4 7.0 - 25.0    Sodium 133 (L) 136 - 145 mmol/L    Potassium 4.5 3.5 - 5.2 mmol/L    Chloride 96 (L) 98 - 107 mmol/L    Total CO2 21.6 (L) 22.0 - 29.0 mmol/L    Calcium 9.4 8.6 - 10.5 mg/dL    Total Protein 7.8 6.0 - 8.5 g/dL    Albumin 4.80 3.50 - 5.20 g/dL    Globulin 3.0 gm/dL    A/G Ratio 1.6 g/dL    Total Bilirubin 0.3 0.2 - 1.2 mg/dL    Alkaline Phosphatase 61 39 - 117 U/L    AST (SGOT) 15 1 - 40 U/L    ALT (SGPT) 26 1 - 41 U/L   Vitamin D 25 Hydroxy   Result Value Ref Range    25  Hydroxy, Vitamin D 36.7 30.0 - 100.0 ng/ml   Uric Acid   Result Value Ref Range    Uric Acid 4.4 3.4 - 7.0 mg/dL   Thyroglobulin With Anti-TG   Result Value Ref Range    Thyroglobulin Ab <1.0 0.0 - 0.9 IU/mL   T4, Free   Result Value Ref Range    Free T4 1.50 0.93 - 1.70 ng/dL   T4 & TSH ([facility])   Result Value Ref Range    TSH 1.150 0.270 - 4.200 mIU/mL    T4, Total 9.11 4.50 - 11.70 mcg/dL   T3, Free   Result Value Ref Range    T3, Free 2.7 2.0 - 4.4 pg/mL   Thyroglobulin By KRISTA   Result Value Ref Range    THYROGLOBULIN BY KRISTA 6.9 1.4 - 29.2 ng/mL   Cardiovascular Risk Assessment   Result Value Ref Range    Interpretation Note    Diabetes Patient Education   Result Value Ref Range    PDF Image Not applicable          Assessment/Plan   Diagnoses and all orders for this visit:    Type 2 diabetes mellitus without complication, without long-term current use of insulin (CMS/[facility])  -     T4 & TSH ([facility]); Future  -     TestT+TestF+SHBG; Future  -     Uric Acid; Future  -     Vitamin D 25 Hydroxy; Future  -     Comprehensive Metabolic Panel; Future  -     C-Peptide; Future  -     Hemoglobin A1c; Future  -     Lipid Panel; Future  -     PSA DIAGNOSTIC; Future  -     Hemoglobin & Hematocrit, Blood; Future    Essential hypertension  -     T4 & TSH ([facility]); Future  -     TestT+TestF+SHBG; Future  -     Uric Acid; Future  -     Vitamin D 25 Hydroxy; Future  -     Comprehensive Metabolic Panel; Future  -     C-Peptide; Future  -     Hemoglobin A1c; Future  -     Lipid Panel; Future  -     PSA DIAGNOSTIC; Future  -     Hemoglobin & Hematocrit, Blood; Future    Vitamin D deficiency  -     T4 & TSH ([facility]); Future  -     TestT+TestF+SHBG; Future  -     Uric Acid; Future  -     Vitamin D 25 Hydroxy; Future  -     Comprehensive Metabolic Panel; Future  -     C-Peptide; Future  -     Hemoglobin A1c; Future  -     Lipid Panel; Future  -     PSA DIAGNOSTIC; Future  -     Hemoglobin & Hematocrit, Blood; Future    Atherogenic  dyslipidemia  -     T4 & TSH ([facility]); Future  -     TestT+TestF+SHBG; Future  -     Uric Acid; Future  -     Vitamin D 25 Hydroxy; Future  -     Comprehensive Metabolic Panel; Future  -     C-Peptide; Future  -     Hemoglobin A1c; Future  -     Lipid Panel; Future  -     PSA DIAGNOSTIC; Future  -     Hemoglobin & Hematocrit, Blood; Future    Decreased libido  -     T4 & TSH ([facility]); Future  -     TestT+TestF+SHBG; Future  -     Uric Acid; Future  -     Vitamin D 25 Hydroxy; Future  -     Comprehensive Metabolic Panel; Future  -     C-Peptide; Future  -     Hemoglobin A1c; Future  -     Lipid Panel; Future  -     PSA DIAGNOSTIC; Future  -     Hemoglobin & Hematocrit, Blood; Future    Low testosterone  -     T4 & TSH ([facility]); Future  -     TestT+TestF+SHBG; Future  -     Uric Acid; Future  -     Vitamin D 25 Hydroxy; Future  -     Comprehensive Metabolic Panel; Future  -     C-Peptide; Future  -     Hemoglobin A1c; Future  -     Lipid Panel; Future  -     PSA DIAGNOSTIC; Future  -     Hemoglobin & Hematocrit, Blood; Future    Other orders  -     rosuvastatin (CRESTOR) 20 MG tablet; Take 1 tablet by mouth Every Night.  -     testosterone (ANDROGEL) 50 MG/5GM (1%) gel gel; Place 100 mg on the skin as directed by provider Daily.  -     pioglitazone (ACTOS) 45 MG tablet; Take 1 tablet by mouth Daily.          In summary I saw and examined this 54-year-old gentleman for above-mentioned problems.  I reviewed his laboratory evaluation of March 22, 2019 and provided him with a hard copy of it.  He does have hypogonadism by virtue of having low testosterone as well as rolled free testosterone.  Also his total cholesterol and LDL level are elevated and because of this I am starting him on Crestor 20 mg daily.  I would like to start him on basal insulin however he ask for little more time to do his best with diet and exercise and see if he could get he is A1c below 7.  He will continue rest of his prescriptions and I will  see him in 4 months or sooner if needed with laboratory evaluation prior to each office visit.

## 2019-04-11 RX ORDER — TESTOSTERONE CYPIONATE 200 MG/ML
200 INJECTION, SOLUTION INTRAMUSCULAR
Qty: 3 ML | Refills: 5 | Status: SHIPPED | OUTPATIENT
Start: 2019-04-11 | End: 2019-08-14 | Stop reason: SDDI

## 2019-04-11 RX ORDER — TESTOSTERONE CYPIONATE 200 MG/ML
200 INJECTION, SOLUTION INTRAMUSCULAR
Qty: 3 ML | Refills: 5 | Status: SHIPPED | OUTPATIENT
Start: 2019-04-11 | End: 2019-04-11 | Stop reason: SDUPTHER

## 2019-06-17 RX ORDER — INSULIN GLARGINE AND LIXISENATIDE 100; 33 U/ML; UG/ML
60 INJECTION, SOLUTION SUBCUTANEOUS
Qty: 5 PEN | Refills: 5 | Status: SHIPPED | OUTPATIENT
Start: 2019-06-17 | End: 2019-06-19

## 2019-06-18 ENCOUNTER — TELEPHONE (OUTPATIENT)
Dept: ENDOCRINOLOGY | Age: 54
End: 2019-06-18

## 2019-06-18 NOTE — TELEPHONE ENCOUNTER
----- Message from Loretta Morillo MD sent at 6/17/2019  4:52 PM EDT -----  I am starting him on 16 units every morning and every 4 days if his fasting blood glucose is greater than 110 increase the dose by 4 units up to 60 units.  He is welcome to stop by at the office to  a sample and a co-pay reduction card.  He does not need to take Starlix any longer.  ----- Message -----  From: Zohreh Nair MA  Sent: 6/17/2019   1:21 PM  To: Loretta Morillo MD        ----- Message -----  From: Chelo Croft MA  Sent: 6/17/2019  12:35 PM  To: Zohreh Nair MA    Pt called this afternoon stating that he originally did not want to go on insulin but his bg is continously staying in the range of 190-210. He is wanting to go on insulin now to control it. He could not provide a blood glucose log.    Please call: [phone], you are able to leave a message.    Spoke to patient. He expressed understanding. He will come and  samples.

## 2019-06-18 NOTE — TELEPHONE ENCOUNTER
Pt has a question regarding his new rx for SOLIQUA 100-33 UNT-MCG/ML solution pen-injector. He is wondering if he needs to stop any of his other meds. Pt can be contacted at [phone].

## 2019-06-19 ENCOUNTER — TELEPHONE (OUTPATIENT)
Dept: ENDOCRINOLOGY | Age: 54
End: 2019-06-19

## 2019-06-19 RX ORDER — EXENATIDE 2 MG/.85ML
2 INJECTION, SUSPENSION, EXTENDED RELEASE SUBCUTANEOUS WEEKLY
Qty: 12 PEN | Refills: 3 | Status: SHIPPED | OUTPATIENT
Start: 2019-06-19 | End: 2019-08-14 | Stop reason: SDDI

## 2019-06-19 RX ORDER — INSULIN GLARGINE 100 [IU]/ML
INJECTION, SOLUTION SUBCUTANEOUS
Qty: 18 PEN | Refills: 3 | Status: SHIPPED | OUTPATIENT
Start: 2019-06-19 | End: 2019-08-14 | Stop reason: SDUPTHER

## 2019-06-19 NOTE — TELEPHONE ENCOUNTER
----- Message from Loretta Morillo MD sent at 6/19/2019  9:37 AM EDT -----  I just received a message from his health insurance company that this is no longer on their formulary.  I therefore have switched him to Bydureon on 2 mg once a week and basic lar same number of units he is taking Soliqua.  ----- Message -----  From: Zohreh Nair MA  Sent: 6/19/2019   8:18 AM  To: Loretta Morillo MD    Pt has a question regarding his new rx for SOLIQUA 100-33 UNT-MCG/ML solution pen-injector. He is wondering if he needs to stop any of his other meds. Pt can be contacted at [phone].     Spoke to patient. He states that he is at work and will call back to discuss medications.

## 2019-06-20 ENCOUNTER — PRIOR AUTHORIZATION (OUTPATIENT)
Dept: ENDOCRINOLOGY | Age: 54
End: 2019-06-20

## 2019-07-22 ENCOUNTER — RESULTS ENCOUNTER (OUTPATIENT)
Dept: ENDOCRINOLOGY | Age: 54
End: 2019-07-22

## 2019-07-22 DIAGNOSIS — E11.9 TYPE 2 DIABETES MELLITUS WITHOUT COMPLICATION, WITHOUT LONG-TERM CURRENT USE OF INSULIN (HCC): ICD-10-CM

## 2019-07-22 DIAGNOSIS — E55.9 VITAMIN D DEFICIENCY: ICD-10-CM

## 2019-07-22 DIAGNOSIS — R79.89 LOW TESTOSTERONE: ICD-10-CM

## 2019-07-22 DIAGNOSIS — R68.82 DECREASED LIBIDO: ICD-10-CM

## 2019-07-22 DIAGNOSIS — I10 ESSENTIAL HYPERTENSION: ICD-10-CM

## 2019-07-22 DIAGNOSIS — E78.5 ATHEROGENIC DYSLIPIDEMIA: ICD-10-CM

## 2019-08-01 LAB
25(OH)D3+25(OH)D2 SERPL-MCNC: 30.6 NG/ML (ref 30–100)
ALBUMIN SERPL-MCNC: 4.3 G/DL (ref 3.5–5.2)
ALBUMIN/GLOB SERPL: 1.3 G/DL
ALP SERPL-CCNC: 88 U/L (ref 39–117)
ALT SERPL-CCNC: 27 U/L (ref 1–41)
AST SERPL-CCNC: 17 U/L (ref 1–40)
BILIRUB SERPL-MCNC: 0.3 MG/DL (ref 0.2–1.2)
BUN SERPL-MCNC: 10 MG/DL (ref 6–20)
BUN/CREAT SERPL: 12.7 (ref 7–25)
C PEPTIDE SERPL-MCNC: 3.7 NG/ML (ref 1.1–4.4)
CALCIUM SERPL-MCNC: 9 MG/DL (ref 8.6–10.5)
CHLORIDE SERPL-SCNC: 95 MMOL/L (ref 98–107)
CHOLEST SERPL-MCNC: 230 MG/DL (ref 0–200)
CO2 SERPL-SCNC: 24.6 MMOL/L (ref 22–29)
CREAT SERPL-MCNC: 0.79 MG/DL (ref 0.76–1.27)
GLOBULIN SER CALC-MCNC: 3.4 GM/DL
GLUCOSE SERPL-MCNC: 414 MG/DL (ref 65–99)
HBA1C MFR BLD: 10.1 % (ref 4.8–5.6)
HCT VFR BLD AUTO: 43.2 % (ref 37.5–51)
HDLC SERPL-MCNC: 40 MG/DL (ref 40–60)
HGB BLD-MCNC: 13.9 G/DL (ref 13–17.7)
INTERPRETATION: NORMAL
LDLC SERPL CALC-MCNC: ABNORMAL MG/DL
Lab: NORMAL
POTASSIUM SERPL-SCNC: 4.4 MMOL/L (ref 3.5–5.2)
PROT SERPL-MCNC: 7.7 G/DL (ref 6–8.5)
PSA SERPL-MCNC: 2.68 NG/ML (ref 0–4)
SHBG SERPL-SCNC: 21.4 NMOL/L (ref 19.3–76.4)
SODIUM SERPL-SCNC: 134 MMOL/L (ref 136–145)
T4 SERPL-MCNC: 7.91 MCG/DL (ref 4.5–11.7)
TESTOST FREE SERPL-MCNC: 8.4 PG/ML (ref 7.2–24)
TESTOST SERPL-MCNC: 168 NG/DL (ref 264–916)
TRIGL SERPL-MCNC: 600 MG/DL (ref 0–150)
TSH SERPL DL<=0.005 MIU/L-ACNC: 1.05 MIU/ML (ref 0.27–4.2)
URATE SERPL-MCNC: 4 MG/DL (ref 3.4–7)
VLDLC SERPL CALC-MCNC: ABNORMAL MG/DL

## 2019-08-14 ENCOUNTER — OFFICE VISIT (OUTPATIENT)
Dept: ENDOCRINOLOGY | Age: 54
End: 2019-08-14

## 2019-08-14 VITALS
WEIGHT: 193.8 LBS | BODY MASS INDEX: 27.13 KG/M2 | RESPIRATION RATE: 16 BRPM | SYSTOLIC BLOOD PRESSURE: 126 MMHG | HEIGHT: 71 IN | DIASTOLIC BLOOD PRESSURE: 82 MMHG

## 2019-08-14 DIAGNOSIS — I10 ESSENTIAL HYPERTENSION: ICD-10-CM

## 2019-08-14 DIAGNOSIS — E78.5 ATHEROGENIC DYSLIPIDEMIA: ICD-10-CM

## 2019-08-14 DIAGNOSIS — R79.89 LOW TESTOSTERONE: ICD-10-CM

## 2019-08-14 DIAGNOSIS — E55.9 VITAMIN D DEFICIENCY: ICD-10-CM

## 2019-08-14 DIAGNOSIS — E11.9 TYPE 2 DIABETES MELLITUS WITHOUT COMPLICATION, WITHOUT LONG-TERM CURRENT USE OF INSULIN (HCC): Primary | ICD-10-CM

## 2019-08-14 PROCEDURE — 99215 OFFICE O/P EST HI 40 MIN: CPT | Performed by: INTERNAL MEDICINE

## 2019-08-14 RX ORDER — ERTUGLIFLOZIN 15 MG/1
TABLET, FILM COATED ORAL
Qty: 90 TABLET | Refills: 3 | Status: SHIPPED | OUTPATIENT
Start: 2019-08-14 | End: 2019-12-03 | Stop reason: SDUPTHER

## 2019-08-14 RX ORDER — ICOSAPENT ETHYL 1000 MG/1
2 CAPSULE ORAL 2 TIMES DAILY WITH MEALS
Qty: 360 CAPSULE | Refills: 3 | Status: SHIPPED | OUTPATIENT
Start: 2019-08-14 | End: 2019-08-20

## 2019-08-14 RX ORDER — PIOGLITAZONEHYDROCHLORIDE 45 MG/1
45 TABLET ORAL DAILY
Qty: 90 TABLET | Refills: 3 | Status: SHIPPED | OUTPATIENT
Start: 2019-08-14 | End: 2019-12-03 | Stop reason: SDUPTHER

## 2019-08-14 RX ORDER — INSULIN GLARGINE 100 [IU]/ML
INJECTION, SOLUTION SUBCUTANEOUS
Qty: 18 PEN | Refills: 3 | Status: SHIPPED | OUTPATIENT
Start: 2019-08-14 | End: 2019-12-03 | Stop reason: SDUPTHER

## 2019-08-14 RX ORDER — SITAGLIPTIN AND METFORMIN HYDROCHLORIDE 1000; 50 MG/1; MG/1
2 TABLET, FILM COATED, EXTENDED RELEASE ORAL
Qty: 180 TABLET | Refills: 3 | Status: SHIPPED | OUTPATIENT
Start: 2019-08-14 | End: 2019-12-03 | Stop reason: SDUPTHER

## 2019-08-14 RX ORDER — ROSUVASTATIN CALCIUM 40 MG/1
40 TABLET, COATED ORAL DAILY
Qty: 90 TABLET | Refills: 3 | Status: SHIPPED | OUTPATIENT
Start: 2019-08-14 | End: 2019-12-03 | Stop reason: SDUPTHER

## 2019-08-14 RX ORDER — ERGOCALCIFEROL 1.25 MG/1
50000 CAPSULE ORAL 3 TIMES WEEKLY
Qty: 39 CAPSULE | Refills: 3 | Status: SHIPPED | OUTPATIENT
Start: 2019-08-14 | End: 2019-12-03 | Stop reason: SDUPTHER

## 2019-08-14 RX ORDER — RAMIPRIL 5 MG/1
CAPSULE ORAL
Qty: 270 CAPSULE | Refills: 3 | Status: SHIPPED | OUTPATIENT
Start: 2019-08-14

## 2019-08-14 NOTE — PROGRESS NOTES
"Subjective   Wyatt Speulveda is a 54 y.o. male seen for follow up for DM2, lab review. Patient states that he is only taking his insulin. He is checking BG only as needed. He denies any problems or concerns.     History of Present Illness this is a 54-year-old gentleman known patient with type 2 diabetes hypertension and dyslipidemia as well as vitamin D deficiency.  Over the course of last 4 months he has had no significant health problem for which to go to the emergency room or hospital.  On his last office visit he was a started on multiple medications to address multiple metabolic issues and he has a stopped them all because someone had told him he only needs to take insulin.    /82   Resp 16   Ht 180.3 cm (71\")   Wt 87.9 kg (193 lb 12.8 oz)   BMI 27.03 kg/m²      No Known Allergies    Current Outpatient Medications:   •  Blood Glucose Monitoring Suppl (ONETOUCH VERIO FLEX SYSTEM) w/Device kit, 1 Device Daily., Disp: 1 kit, Rfl: 0  •  ergocalciferol (ERGOCALCIFEROL) 58685 units capsule, Take 1 capsule by mouth 3 (Three) Times a Week., Disp: 39 capsule, Rfl: 3  •  Insulin Glargine (BASAGLAR KWIKPEN) 100 UNIT/ML injection pen, See pharmacy instructions, Disp: 18 pen, Rfl: 3      The following portions of the patient's history were reviewed and updated as appropriate: allergies, current medications, past family history, past medical history, past social history, past surgical history and problem list.    Review of Systems    Objective   Physical Exam   Constitutional: He is oriented to person, place, and time. He appears well-developed and well-nourished. No distress.   HENT:   Head: Normocephalic and atraumatic.   Right Ear: External ear normal.   Left Ear: External ear normal.   Nose: Nose normal.   Mouth/Throat: Oropharynx is clear and moist. No oropharyngeal exudate.   Eyes: Conjunctivae and EOM are normal. Pupils are equal, round, and reactive to light. Right eye exhibits no discharge. Left eye " exhibits no discharge. No scleral icterus.   Neck: Normal range of motion. Neck supple. No JVD present. No tracheal deviation present. No thyromegaly present.   Cardiovascular: Normal rate, regular rhythm, normal heart sounds and intact distal pulses. Exam reveals no gallop and no friction rub.   No murmur heard.  Pulmonary/Chest: Effort normal and breath sounds normal. No stridor. No respiratory distress. He has no wheezes. He has no rales. He exhibits no tenderness.   Abdominal: Soft. Bowel sounds are normal. He exhibits no distension and no mass. There is no tenderness. There is no rebound and no guarding. No hernia.   Musculoskeletal: Normal range of motion. He exhibits no edema, tenderness or deformity.   Lymphadenopathy:     He has no cervical adenopathy.   Neurological: He is alert and oriented to person, place, and time. He displays normal reflexes. No cranial nerve deficit or sensory deficit. He exhibits normal muscle tone. Coordination normal.   Skin: Skin is warm and dry. No rash noted. He is not diaphoretic. No erythema. No pallor.   Acanthosis nigricans around the lower part of his face and around his neck more prominent anteriorly.   Psychiatric: He has a normal mood and affect. His behavior is normal. Judgment and thought content normal.   Nursing note and vitals reviewed.       Results for orders placed or performed in visit on 07/22/19   T4 & TSH ([facility])   Result Value Ref Range    TSH 1.050 0.270 - 4.200 mIU/mL    T4, Total 7.91 4.50 - 11.70 mcg/dL   TestT+TestF+SHBG   Result Value Ref Range    Testosterone, Total 168 (L) 264 - 916 ng/dL    Testosterone, Free 8.4 7.2 - 24.0 pg/mL    Sex Hormone Binding Globulin 21.4 19.3 - 76.4 nmol/L   Uric Acid   Result Value Ref Range    Uric Acid 4.0 3.4 - 7.0 mg/dL   Vitamin D 25 Hydroxy   Result Value Ref Range    25 Hydroxy, Vitamin D 30.6 30.0 - 100.0 ng/ml   Comprehensive Metabolic Panel   Result Value Ref Range    Glucose 414 (C) 65 - 99 mg/dL    BUN 10  6 - 20 mg/dL    Creatinine 0.79 0.76 - 1.27 mg/dL    eGFR Non African Am 102 >60 mL/min/1.73    eGFR African Am 123 >60 mL/min/1.73    BUN/Creatinine Ratio 12.7 7.0 - 25.0    Sodium 134 (L) 136 - 145 mmol/L    Potassium 4.4 3.5 - 5.2 mmol/L    Chloride 95 (L) 98 - 107 mmol/L    Total CO2 24.6 22.0 - 29.0 mmol/L    Calcium 9.0 8.6 - 10.5 mg/dL    Total Protein 7.7 6.0 - 8.5 g/dL    Albumin 4.30 3.50 - 5.20 g/dL    Globulin 3.4 gm/dL    A/G Ratio 1.3 g/dL    Total Bilirubin 0.3 0.2 - 1.2 mg/dL    Alkaline Phosphatase 88 39 - 117 U/L    AST (SGOT) 17 1 - 40 U/L    ALT (SGPT) 27 1 - 41 U/L   C-Peptide   Result Value Ref Range    C-Peptide 3.7 1.1 - 4.4 ng/mL   Hemoglobin A1c   Result Value Ref Range    Hemoglobin A1C 10.10 (H) 4.80 - 5.60 %   Lipid Panel   Result Value Ref Range    Total Cholesterol 230 (H) 0 - 200 mg/dL    Triglycerides 600 (H) 0 - 150 mg/dL    HDL Cholesterol 40 40 - 60 mg/dL    VLDL Cholesterol CANCELED mg/dL    LDL Cholesterol  CANCELED mg/dL   PSA DIAGNOSTIC   Result Value Ref Range    PSA 2.680 0.000 - 4.000 ng/mL   Hemoglobin & Hematocrit, Blood   Result Value Ref Range    Hemoglobin 13.9 13.0 - 17.7 g/dL    Hematocrit 43.2 37.5 - 51.0 %   Cardiovascular Risk Assessment   Result Value Ref Range    Interpretation Note    Diabetes Patient Education   Result Value Ref Range    PDF Image Not applicable          Assessment/Plan   Diagnoses and all orders for this visit:    Type 2 diabetes mellitus without complication, without long-term current use of insulin (CMS/[facility])  -     T4 & TSH ([facility]); Future  -     TestT+TestF+SHBG; Future  -     Uric Acid; Future  -     Vitamin D 25 Hydroxy; Future  -     Comprehensive Metabolic Panel; Future  -     C-Peptide; Future  -     Follicle Stimulating Hormone; Future  -     Hemoglobin A1c; Future  -     Luteinizing Hormone; Future  -     MicroAlbumin, Urine, Random - Urine, Clean Catch; Future  -     NMR LipoProfile; Future  -     Hemoglobin & Hematocrit, Blood;  Future    Atherogenic dyslipidemia  -     T4 & TSH ([facility]); Future  -     TestT+TestF+SHBG; Future  -     Uric Acid; Future  -     Vitamin D 25 Hydroxy; Future  -     Comprehensive Metabolic Panel; Future  -     C-Peptide; Future  -     Follicle Stimulating Hormone; Future  -     Hemoglobin A1c; Future  -     Luteinizing Hormone; Future  -     MicroAlbumin, Urine, Random - Urine, Clean Catch; Future  -     NMR LipoProfile; Future  -     Hemoglobin & Hematocrit, Blood; Future    Essential hypertension  -     T4 & TSH ([facility]); Future  -     TestT+TestF+SHBG; Future  -     Uric Acid; Future  -     Vitamin D 25 Hydroxy; Future  -     Comprehensive Metabolic Panel; Future  -     C-Peptide; Future  -     Follicle Stimulating Hormone; Future  -     Hemoglobin A1c; Future  -     Luteinizing Hormone; Future  -     MicroAlbumin, Urine, Random - Urine, Clean Catch; Future  -     NMR LipoProfile; Future  -     Hemoglobin & Hematocrit, Blood; Future    Vitamin D deficiency  -     T4 & TSH ([facility]); Future  -     TestT+TestF+SHBG; Future  -     Uric Acid; Future  -     Vitamin D 25 Hydroxy; Future  -     Comprehensive Metabolic Panel; Future  -     C-Peptide; Future  -     Follicle Stimulating Hormone; Future  -     Hemoglobin A1c; Future  -     Luteinizing Hormone; Future  -     MicroAlbumin, Urine, Random - Urine, Clean Catch; Future  -     NMR LipoProfile; Future  -     Hemoglobin & Hematocrit, Blood; Future    Low testosterone  -     T4 & TSH ([facility]); Future  -     TestT+TestF+SHBG; Future  -     Uric Acid; Future  -     Vitamin D 25 Hydroxy; Future  -     Comprehensive Metabolic Panel; Future  -     C-Peptide; Future  -     Follicle Stimulating Hormone; Future  -     Hemoglobin A1c; Future  -     Luteinizing Hormone; Future  -     MicroAlbumin, Urine, Random - Urine, Clean Catch; Future  -     NMR LipoProfile; Future  -     Hemoglobin & Hematocrit, Blood; Future    Other orders  -     STEGLATRO 15 MG tablet; Take 1  "tablet every morning  -     ramipril (ALTACE) 5 MG capsule; 2 tablets in the morning and one in the evening  -     pioglitazone (ACTOS) 45 MG tablet; Take 1 tablet by mouth Daily.  -     JANUMET XR  MG tablet; Take 2 tablets by mouth Daily With Dinner.  -     Insulin Glargine (BASAGLAR KWIKPEN) 100 UNIT/ML injection pen; See pharmacy instructions  -     ergocalciferol (ERGOCALCIFEROL) 64768 units capsule; Take 1 capsule by mouth 3 (Three) Times a Week.  -     rosuvastatin (CRESTOR) 40 MG tablet; Take 1 tablet by mouth Daily.  -     VASCEPA 1 g capsule capsule; Take 2 g by mouth 2 (Two) Times a Day With Meals.      In summary I saw and examined this 54-year-old gentleman for above-mentioned problems.  I reviewed his laboratory evaluation of 7/31/2019 and provided him with a hard copy of it.  All the parameters of his diabetic management as well as lipids and vitamin D are all out of range and therefore I sat down and explained to him what each medication does and for what purpose and the final and of reducing the risks for his future heart attacks and strokes or peripheral vascular disease.  He promised to resume his current medications and I made refills for those.  This office visit including face-to-face counseling and education and planning his future care moving forward lasted 45 minutes of which 25 minutes was spent in face-to-face meeting.  I also ask him in the future if he has any questions he can communicate to me through \"my chart\".  I will see him in 4 months or sooner if needed but labs prior to each office visit.           "

## 2019-08-20 ENCOUNTER — PRIOR AUTHORIZATION (OUTPATIENT)
Dept: ENDOCRINOLOGY | Age: 54
End: 2019-08-20

## 2019-08-20 RX ORDER — OMEGA-3-ACID ETHYL ESTERS 1 G/1
2 CAPSULE, LIQUID FILLED ORAL 2 TIMES DAILY
Qty: 360 CAPSULE | Refills: 3 | Status: SHIPPED | OUTPATIENT
Start: 2019-08-20 | End: 2019-12-03

## 2019-08-22 ENCOUNTER — PRIOR AUTHORIZATION (OUTPATIENT)
Dept: ENDOCRINOLOGY | Age: 54
End: 2019-08-22

## 2019-10-11 ENCOUNTER — PRIOR AUTHORIZATION (OUTPATIENT)
Dept: ENDOCRINOLOGY | Age: 54
End: 2019-10-11

## 2019-11-18 ENCOUNTER — LAB (OUTPATIENT)
Dept: ENDOCRINOLOGY | Age: 54
End: 2019-11-18

## 2019-11-18 DIAGNOSIS — E55.9 VITAMIN D DEFICIENCY: ICD-10-CM

## 2019-11-18 DIAGNOSIS — R79.89 LOW TESTOSTERONE: ICD-10-CM

## 2019-11-18 DIAGNOSIS — E78.5 ATHEROGENIC DYSLIPIDEMIA: ICD-10-CM

## 2019-11-18 DIAGNOSIS — I10 ESSENTIAL HYPERTENSION: ICD-10-CM

## 2019-11-18 DIAGNOSIS — E11.9 TYPE 2 DIABETES MELLITUS WITHOUT COMPLICATION, WITHOUT LONG-TERM CURRENT USE OF INSULIN (HCC): ICD-10-CM

## 2019-11-20 LAB
25(OH)D3+25(OH)D2 SERPL-MCNC: 56.2 NG/ML (ref 30–100)
ALBUMIN SERPL-MCNC: 4.7 G/DL (ref 3.5–5.2)
ALBUMIN/GLOB SERPL: 1.7 G/DL
ALP SERPL-CCNC: 57 U/L (ref 39–117)
ALT SERPL-CCNC: 28 U/L (ref 1–41)
AST SERPL-CCNC: 17 U/L (ref 1–40)
BILIRUB SERPL-MCNC: 0.5 MG/DL (ref 0.2–1.2)
BUN SERPL-MCNC: 14 MG/DL (ref 6–20)
BUN/CREAT SERPL: 15.6 (ref 7–25)
C PEPTIDE SERPL-MCNC: 1.9 NG/ML (ref 1.1–4.4)
CALCIUM SERPL-MCNC: 9.3 MG/DL (ref 8.6–10.5)
CHLORIDE SERPL-SCNC: 100 MMOL/L (ref 98–107)
CHOLEST SERPL-MCNC: 125 MG/DL (ref 100–199)
CO2 SERPL-SCNC: 25.7 MMOL/L (ref 22–29)
CREAT SERPL-MCNC: 0.9 MG/DL (ref 0.76–1.27)
FSH SERPL-ACNC: 11.9 MIU/ML (ref 1.5–12.4)
GLOBULIN SER CALC-MCNC: 2.8 GM/DL
GLUCOSE SERPL-MCNC: 122 MG/DL (ref 65–99)
HBA1C MFR BLD: 8.6 % (ref 4.8–5.6)
HCT VFR BLD AUTO: 39.7 % (ref 37.5–51)
HDL SERPL-SCNC: 42.3 UMOL/L
HDLC SERPL-MCNC: 56 MG/DL
HGB BLD-MCNC: 12.8 G/DL (ref 13–17.7)
LDL SERPL QN: 20 NM
LDL SERPL-SCNC: 485 NMOL/L
LDL SMALL SERPL-SCNC: 268 NMOL/L
LDLC SERPL CALC-MCNC: 46 MG/DL (ref 0–99)
LH SERPL-ACNC: 10.9 MIU/ML (ref 1.7–8.6)
MICROALBUMIN UR-MCNC: 8.8 UG/ML
POTASSIUM SERPL-SCNC: 4.8 MMOL/L (ref 3.5–5.2)
PROT SERPL-MCNC: 7.5 G/DL (ref 6–8.5)
SHBG SERPL-SCNC: 26.5 NMOL/L (ref 19.3–76.4)
SODIUM SERPL-SCNC: 138 MMOL/L (ref 136–145)
T4 SERPL-MCNC: 9.26 MCG/DL (ref 4.5–11.7)
TESTOST FREE SERPL-MCNC: 8.1 PG/ML (ref 7.2–24)
TESTOST SERPL-MCNC: 280 NG/DL (ref 264–916)
TRIGL SERPL-MCNC: 114 MG/DL (ref 0–149)
TSH SERPL DL<=0.005 MIU/L-ACNC: 1.45 UIU/ML (ref 0.27–4.2)
URATE SERPL-MCNC: 3.7 MG/DL (ref 3.4–7)

## 2019-12-03 ENCOUNTER — OFFICE VISIT (OUTPATIENT)
Dept: ENDOCRINOLOGY | Age: 54
End: 2019-12-03

## 2019-12-03 VITALS — HEIGHT: 71 IN | WEIGHT: 196.6 LBS | BODY MASS INDEX: 27.52 KG/M2

## 2019-12-03 DIAGNOSIS — E78.5 ATHEROGENIC DYSLIPIDEMIA: ICD-10-CM

## 2019-12-03 DIAGNOSIS — E55.9 VITAMIN D DEFICIENCY: ICD-10-CM

## 2019-12-03 DIAGNOSIS — R68.82 DECREASED LIBIDO: ICD-10-CM

## 2019-12-03 DIAGNOSIS — I10 ESSENTIAL HYPERTENSION: ICD-10-CM

## 2019-12-03 DIAGNOSIS — E11.9 TYPE 2 DIABETES MELLITUS WITHOUT COMPLICATION, WITHOUT LONG-TERM CURRENT USE OF INSULIN (HCC): Primary | ICD-10-CM

## 2019-12-03 PROCEDURE — 99215 OFFICE O/P EST HI 40 MIN: CPT | Performed by: INTERNAL MEDICINE

## 2019-12-03 RX ORDER — SITAGLIPTIN AND METFORMIN HYDROCHLORIDE 1000; 50 MG/1; MG/1
2 TABLET, FILM COATED, EXTENDED RELEASE ORAL
Qty: 180 TABLET | Refills: 3 | Status: SHIPPED | OUTPATIENT
Start: 2019-12-03 | End: 2020-08-14 | Stop reason: SDUPTHER

## 2019-12-03 RX ORDER — ERTUGLIFLOZIN 15 MG/1
TABLET, FILM COATED ORAL
Qty: 90 TABLET | Refills: 3 | Status: SHIPPED | OUTPATIENT
Start: 2019-12-03 | End: 2020-02-28 | Stop reason: SDUPTHER

## 2019-12-03 RX ORDER — ROSUVASTATIN CALCIUM 40 MG/1
40 TABLET, COATED ORAL DAILY
Qty: 90 TABLET | Refills: 3 | Status: SHIPPED | OUTPATIENT
Start: 2019-12-03 | End: 2020-08-14 | Stop reason: SDUPTHER

## 2019-12-03 RX ORDER — ICOSAPENT ETHYL 1000 MG/1
CAPSULE ORAL
Qty: 360 CAPSULE | Refills: 3 | Status: SHIPPED | OUTPATIENT
Start: 2019-12-03 | End: 2020-08-14 | Stop reason: SDUPTHER

## 2019-12-03 RX ORDER — BLOOD SUGAR DIAGNOSTIC
STRIP MISCELLANEOUS
Refills: 5 | COMMUNITY
Start: 2019-11-13 | End: 2020-08-25

## 2019-12-03 RX ORDER — INSULIN GLARGINE 100 [IU]/ML
INJECTION, SOLUTION SUBCUTANEOUS
Qty: 18 PEN | Refills: 3 | Status: SHIPPED | OUTPATIENT
Start: 2019-12-03 | End: 2020-02-28 | Stop reason: SDUPTHER

## 2019-12-03 RX ORDER — PIOGLITAZONEHYDROCHLORIDE 45 MG/1
45 TABLET ORAL DAILY
Qty: 90 TABLET | Refills: 3 | Status: SHIPPED | OUTPATIENT
Start: 2019-12-03 | End: 2020-08-14 | Stop reason: SDUPTHER

## 2019-12-03 RX ORDER — ERGOCALCIFEROL 1.25 MG/1
50000 CAPSULE ORAL 3 TIMES WEEKLY
Qty: 39 CAPSULE | Refills: 3 | Status: SHIPPED | OUTPATIENT
Start: 2019-12-04 | End: 2020-03-30

## 2019-12-03 RX ORDER — ICOSAPENT ETHYL 1000 MG/1
CAPSULE ORAL
Refills: 3 | COMMUNITY
Start: 2019-10-11 | End: 2019-12-03 | Stop reason: SDUPTHER

## 2019-12-03 NOTE — PROGRESS NOTES
"Ketty Sepulveda is a 54 y.o. male for follow up for DM2, lab review. No meter    Ht 180.3 cm (71\")   Wt 89.2 kg (196 lb 9.6 oz)   BMI 27.42 kg/m²     No Known Allergies      Current Outpatient Medications:   •  Blood Glucose Monitoring Suppl (ONETOUCH VERIO FLEX SYSTEM) w/Device kit, 1 Device Daily., Disp: 1 kit, Rfl: 0  •  ergocalciferol (ERGOCALCIFEROL) 88594 units capsule, Take 1 capsule by mouth 3 (Three) Times a Week., Disp: 39 capsule, Rfl: 3  •  Insulin Glargine (BASAGLAR KWIKPEN) 100 UNIT/ML injection pen, See pharmacy instructions, Disp: 18 pen, Rfl: 3  •  JANUMET XR  MG tablet, Take 2 tablets by mouth Daily With Dinner., Disp: 180 tablet, Rfl: 3  •  omega-3 acid ethyl esters (LOVAZA) 1 g capsule, Take 2 capsules by mouth 2 (Two) Times a Day., Disp: 360 capsule, Rfl: 3  •  ONETOUCH VERIO test strip, USE TO CHECK BLOOD GLUCOSE TID, Disp: , Rfl: 5  •  pioglitazone (ACTOS) 45 MG tablet, Take 1 tablet by mouth Daily., Disp: 90 tablet, Rfl: 3  •  ramipril (ALTACE) 5 MG capsule, 2 tablets in the morning and one in the evening, Disp: 270 capsule, Rfl: 3  •  rosuvastatin (CRESTOR) 40 MG tablet, Take 1 tablet by mouth Daily., Disp: 90 tablet, Rfl: 3  •  STEGLATRO 15 MG tablet, Take 1 tablet every morning, Disp: 90 tablet, Rfl: 3  •  VASCEPA 1 g capsule capsule, TK 2 CS PO BID WC, Disp: , Rfl: 3    History of Present Illness this is a 54-year-old gentleman known patient with type 2 diabetes hypertension and dyslipidemia and vitamin D deficiency.  Over the course of last 6 months he has had no significant health problem for which to go to the ER or hospital.  He is timing of taking his insulin are inconsistent based on his work and it could very with patient to in the morning up to 9 in the morning.  He also takes his insulin not at the same time that he checks his blood glucose and there is this issue of him having to cut back or increase the dose of his Basaglar.    The following portions of the " patient's history were reviewed and updated as appropriate: allergies, current medications, past family history, past medical history, past social history, past surgical history and problem list.    Review of Systems   Constitutional: Negative.    HENT: Negative.    Eyes: Negative.    Respiratory: Negative.    Cardiovascular: Negative.    Gastrointestinal: Negative.    Endocrine: Negative.    Genitourinary: Negative.    Musculoskeletal: Negative.    Skin: Negative.    Allergic/Immunologic: Negative.    Neurological: Negative.    Hematological: Negative.    Psychiatric/Behavioral: Negative.    The above review of system was reviewed, corroborated and accepted.    Objective      Results for orders placed or performed in visit on 11/18/19   T4 & TSH ([facility])   Result Value Ref Range    TSH 1.450 0.270 - 4.200 uIU/mL    T4, Total 9.26 4.50 - 11.70 mcg/dL   TestT+TestF+SHBG   Result Value Ref Range    Testosterone, Total 280 264 - 916 ng/dL    Testosterone, Free 8.1 7.2 - 24.0 pg/mL    Sex Hormone Binding Globulin 26.5 19.3 - 76.4 nmol/L   Uric Acid   Result Value Ref Range    Uric Acid 3.7 3.4 - 7.0 mg/dL   Vitamin D 25 Hydroxy   Result Value Ref Range    25 Hydroxy, Vitamin D 56.2 30.0 - 100.0 ng/ml   Comprehensive Metabolic Panel   Result Value Ref Range    Glucose 122 (H) 65 - 99 mg/dL    BUN 14 6 - 20 mg/dL    Creatinine 0.90 0.76 - 1.27 mg/dL    eGFR Non African Am 88 >60 mL/min/1.73    eGFR African Am 106 >60 mL/min/1.73    BUN/Creatinine Ratio 15.6 7.0 - 25.0    Sodium 138 136 - 145 mmol/L    Potassium 4.8 3.5 - 5.2 mmol/L    Chloride 100 98 - 107 mmol/L    Total CO2 25.7 22.0 - 29.0 mmol/L    Calcium 9.3 8.6 - 10.5 mg/dL    Total Protein 7.5 6.0 - 8.5 g/dL    Albumin 4.70 3.50 - 5.20 g/dL    Globulin 2.8 gm/dL    A/G Ratio 1.7 g/dL    Total Bilirubin 0.5 0.2 - 1.2 mg/dL    Alkaline Phosphatase 57 39 - 117 U/L    AST (SGOT) 17 1 - 40 U/L    ALT (SGPT) 28 1 - 41 U/L   C-Peptide   Result Value Ref Range     C-Peptide 1.9 1.1 - 4.4 ng/mL   Follicle Stimulating Hormone   Result Value Ref Range    FSH 11.9 1.5 - 12.4 mIU/mL   Hemoglobin A1c   Result Value Ref Range    Hemoglobin A1C 8.60 (H) 4.80 - 5.60 %   Luteinizing Hormone   Result Value Ref Range    LH 10.9 (H) 1.7 - 8.6 mIU/mL   MicroAlbumin, Urine, Random - Urine, Clean Catch   Result Value Ref Range    Microalbumin, Urine 8.8 Not Estab. ug/mL   NMR LipoProfile   Result Value Ref Range    LDL-P 485 <1,000 nmol/L    LDL-C 46 0 - 99 mg/dL    HDL-C 56 >39 mg/dL    Triglycerides 114 0 - 149 mg/dL    Total Cholesterol 125 100 - 199 mg/dL    HDL-P (Total) 42.3 >=30.5 umol/L    Small LDL-P 268 <=527 nmol/L    LDL Size 20.0 (L) >20.5 nm   Hemoglobin & Hematocrit, Blood   Result Value Ref Range    Hemoglobin 12.8 (L) 13.0 - 17.7 g/dL    Hematocrit 39.7 37.5 - 51.0 %       Physical Exam   Constitutional: He is oriented to person, place, and time. He appears well-developed and well-nourished. No distress.   HENT:   Head: Normocephalic and atraumatic.   Right Ear: External ear normal.   Left Ear: External ear normal.   Nose: Nose normal.   Mouth/Throat: Oropharynx is clear and moist. No oropharyngeal exudate.   Eyes: Conjunctivae and EOM are normal. Pupils are equal, round, and reactive to light. Right eye exhibits no discharge. Left eye exhibits no discharge. No scleral icterus.   Neck: Normal range of motion. Neck supple. No JVD present. No tracheal deviation present. No thyromegaly present.   Cardiovascular: Normal rate, regular rhythm, normal heart sounds and intact distal pulses. Exam reveals no gallop and no friction rub.   No murmur heard.  Pulmonary/Chest: Effort normal and breath sounds normal. No stridor. No respiratory distress. He has no wheezes. He has no rales. He exhibits no tenderness.   Abdominal: Soft. Bowel sounds are normal. He exhibits no distension and no mass. There is no tenderness. There is no rebound and no guarding. No hernia.   Musculoskeletal:  Normal range of motion. He exhibits no edema, tenderness or deformity.   Lymphadenopathy:     He has no cervical adenopathy.   Neurological: He is alert and oriented to person, place, and time. He displays normal reflexes. No cranial nerve deficit or sensory deficit. He exhibits normal muscle tone. Coordination normal.   Skin: Skin is warm and dry. No rash noted. He is not diaphoretic. No erythema. No pallor.   Acanthosis nigricans around the lower part of his face and around his neck more prominent anteriorly.   Psychiatric: He has a normal mood and affect. His behavior is normal. Judgment and thought content normal.   Nursing note and vitals reviewed.  No significant change since 8/4/2019 office visit.      Assessment/Plan   Diagnoses and all orders for this visit:    Type 2 diabetes mellitus without complication, without long-term current use of insulin (CMS/[facility])  -     T4 & TSH ([facility]); Future  -     Uric Acid; Future  -     Vitamin D 25 Hydroxy; Future  -     Comprehensive Metabolic Panel; Future  -     C-Peptide; Future  -     Hemoglobin A1c; Future  -     MicroAlbumin, Urine, Random - Urine, Clean Catch; Future  -     NMR LipoProfile; Future    Vitamin D deficiency  -     T4 & TSH ([facility]); Future  -     Uric Acid; Future  -     Vitamin D 25 Hydroxy; Future  -     Comprehensive Metabolic Panel; Future  -     C-Peptide; Future  -     Hemoglobin A1c; Future  -     MicroAlbumin, Urine, Random - Urine, Clean Catch; Future  -     NMR LipoProfile; Future    Essential hypertension  -     T4 & TSH ([facility]); Future  -     Uric Acid; Future  -     Vitamin D 25 Hydroxy; Future  -     Comprehensive Metabolic Panel; Future  -     C-Peptide; Future  -     Hemoglobin A1c; Future  -     MicroAlbumin, Urine, Random - Urine, Clean Catch; Future  -     NMR LipoProfile; Future    Atherogenic dyslipidemia  -     T4 & TSH ([facility]); Future  -     Uric Acid; Future  -     Vitamin D 25 Hydroxy; Future  -     Comprehensive  Metabolic Panel; Future  -     C-Peptide; Future  -     Hemoglobin A1c; Future  -     MicroAlbumin, Urine, Random - Urine, Clean Catch; Future  -     NMR LipoProfile; Future    Decreased libido  -     T4 & TSH ([facility]); Future  -     Uric Acid; Future  -     Vitamin D 25 Hydroxy; Future  -     Comprehensive Metabolic Panel; Future  -     C-Peptide; Future  -     Hemoglobin A1c; Future  -     MicroAlbumin, Urine, Random - Urine, Clean Catch; Future  -     NMR LipoProfile; Future    Other orders  -     VASCEPA 1 g capsule capsule; 2 capsules twice daily  -     STEGLATRO 15 MG tablet; Take 1 tablet every morning  -     rosuvastatin (CRESTOR) 40 MG tablet; Take 1 tablet by mouth Daily.  -     pioglitazone (ACTOS) 45 MG tablet; Take 1 tablet by mouth Daily.  -     JANUMET XR  MG tablet; Take 2 tablets by mouth Daily With Dinner.  -     ergocalciferol (ERGOCALCIFEROL) 1.25 MG (21797 UT) capsule; Take 1 capsule by mouth 3 (Three) Times a Week.  -     Insulin Glargine (BASAGLAR KWIKPEN) 100 UNIT/ML injection pen; See pharmacy instructions      In summary I saw and examined this 54-year-old gentleman for above-mentioned problems.  I reviewed his laboratory evaluation of 11/18/2019 and provided him with a hard copy of it.  Aside from hemoglobin A1c of 8.60 he is otherwise clinically and metabolically stable therefore we will go ahead and continue current medications.  We reviewed the way he should regulate the dose of his basal car and he will start at 30 units every morning and every 3 days increase it by 2 units if his fasting blood glucose was greater than 110 and that is where he will stay and not go back and forth and his dosing.  I made sure he was able to repeat what I already told him.  I also asked him to be consistent as far as the timing of his checking blood glucose and giving himself insulin.  This office visit lasted 40 minutes of which 25 minutes was a spent on face-to-face counseling and education and  planning his care moving forward.  I will see him in 6 months or sooner if needed but laboratory evaluation prior to each office visit.

## 2020-02-11 ENCOUNTER — TELEPHONE (OUTPATIENT)
Dept: ENDOCRINOLOGY | Age: 55
End: 2020-02-11

## 2020-02-13 NOTE — TELEPHONE ENCOUNTER
Please call the patient to schedule with Dr. Morillo or Joan. He just needs to make an appointment.

## 2020-02-19 DIAGNOSIS — I10 ESSENTIAL HYPERTENSION: Primary | ICD-10-CM

## 2020-02-19 DIAGNOSIS — E55.9 VITAMIN D DEFICIENCY: ICD-10-CM

## 2020-02-19 DIAGNOSIS — E11.9 TYPE 2 DIABETES MELLITUS WITHOUT COMPLICATION, WITHOUT LONG-TERM CURRENT USE OF INSULIN (HCC): ICD-10-CM

## 2020-02-19 DIAGNOSIS — E78.5 ATHEROGENIC DYSLIPIDEMIA: ICD-10-CM

## 2020-02-20 LAB
25(OH)D3+25(OH)D2 SERPL-MCNC: 85.9 NG/ML (ref 30–100)
ALBUMIN SERPL-MCNC: 4.3 G/DL (ref 3.5–5.2)
ALBUMIN/GLOB SERPL: 1.5 G/DL
ALP SERPL-CCNC: 53 U/L (ref 39–117)
ALT SERPL-CCNC: 25 U/L (ref 1–41)
AST SERPL-CCNC: 14 U/L (ref 1–40)
BILIRUB SERPL-MCNC: 0.6 MG/DL (ref 0.2–1.2)
BUN SERPL-MCNC: 15 MG/DL (ref 6–20)
BUN/CREAT SERPL: 16.7 (ref 7–25)
C PEPTIDE SERPL-MCNC: 1.8 NG/ML (ref 1.1–4.4)
CALCIUM SERPL-MCNC: 8.8 MG/DL (ref 8.6–10.5)
CHLORIDE SERPL-SCNC: 103 MMOL/L (ref 98–107)
CHOLEST SERPL-MCNC: 108 MG/DL (ref 0–200)
CO2 SERPL-SCNC: 21.5 MMOL/L (ref 22–29)
CREAT SERPL-MCNC: 0.9 MG/DL (ref 0.76–1.27)
GLOBULIN SER CALC-MCNC: 2.8 GM/DL
GLUCOSE SERPL-MCNC: 147 MG/DL (ref 65–99)
HBA1C MFR BLD: 7.9 % (ref 4.8–5.6)
HDLC SERPL-MCNC: 50 MG/DL (ref 40–60)
INTERPRETATION: NORMAL
LDLC SERPL CALC-MCNC: 45 MG/DL (ref 0–100)
Lab: NORMAL
POTASSIUM SERPL-SCNC: 4.2 MMOL/L (ref 3.5–5.2)
PROT SERPL-MCNC: 7.1 G/DL (ref 6–8.5)
SODIUM SERPL-SCNC: 139 MMOL/L (ref 136–145)
TRIGL SERPL-MCNC: 67 MG/DL (ref 0–150)
VLDLC SERPL CALC-MCNC: 13.4 MG/DL

## 2020-02-28 ENCOUNTER — OFFICE VISIT (OUTPATIENT)
Dept: ENDOCRINOLOGY | Age: 55
End: 2020-02-28

## 2020-02-28 VITALS
WEIGHT: 195 LBS | SYSTOLIC BLOOD PRESSURE: 130 MMHG | BODY MASS INDEX: 27.3 KG/M2 | DIASTOLIC BLOOD PRESSURE: 80 MMHG | HEIGHT: 71 IN

## 2020-02-28 DIAGNOSIS — Z79.4 TYPE 2 DIABETES MELLITUS WITH HYPERGLYCEMIA, WITH LONG-TERM CURRENT USE OF INSULIN (HCC): Primary | ICD-10-CM

## 2020-02-28 DIAGNOSIS — I10 ESSENTIAL HYPERTENSION: ICD-10-CM

## 2020-02-28 DIAGNOSIS — E78.5 ATHEROGENIC DYSLIPIDEMIA: ICD-10-CM

## 2020-02-28 DIAGNOSIS — E55.9 VITAMIN D DEFICIENCY: ICD-10-CM

## 2020-02-28 DIAGNOSIS — E11.65 TYPE 2 DIABETES MELLITUS WITH HYPERGLYCEMIA, WITH LONG-TERM CURRENT USE OF INSULIN (HCC): Primary | ICD-10-CM

## 2020-02-28 PROCEDURE — 99214 OFFICE O/P EST MOD 30 MIN: CPT | Performed by: NURSE PRACTITIONER

## 2020-02-28 RX ORDER — INSULIN GLARGINE 100 [IU]/ML
INJECTION, SOLUTION SUBCUTANEOUS
Qty: 3 PEN | Refills: 1 | Status: SHIPPED | OUTPATIENT
Start: 2020-02-28 | End: 2020-08-10

## 2020-02-28 RX ORDER — ERTUGLIFLOZIN 15 MG/1
TABLET, FILM COATED ORAL
Qty: 90 TABLET | Refills: 1 | Status: SHIPPED | OUTPATIENT
Start: 2020-02-28 | End: 2020-08-14 | Stop reason: SDUPTHER

## 2020-02-28 NOTE — PATIENT INSTRUCTIONS
email blood sugars readings include date and time  We may restart a lower dose of basaglar  Schedule an eye doctor appt            Hypoglycemia  Hypoglycemia is when the sugar (glucose) level in your blood is too low. Signs of low blood sugar may include:  · Feeling:  ? Hungry.  ? Worried or nervous (anxious).  ? Sweaty and clammy.  ? Confused.  ? Dizzy.  ? Sleepy.  ? Sick to your stomach (nauseous).  · Having:  ? A fast heartbeat.  ? A headache.  ? A change in your vision.  ? Tingling or no feeling (numbness) around your mouth, lips, or tongue.  ? Jerky movements that you cannot control (seizure).  · Having trouble with:  ? Moving (coordination).  ? Sleeping.  ? Passing out (fainting).  ? Getting upset easily (irritability).  Low blood sugar can happen to people who have diabetes and people who do not have diabetes. Low blood sugar can happen quickly, and it can be an emergency.  Treating low blood sugar  Low blood sugar is often treated by eating or drinking something sugary right away, such as:  · Fruit juice, 4-6 oz (120-150 mL).  · Regular soda (not diet soda), 4-6 oz (120-150 mL).  · Low-fat milk, 4 oz (120 mL).  · Several pieces of hard candy.  · Sugar or honey, 1 Tbsp (15 mL).  Treating low blood sugar if you have diabetes  If you can think clearly and swallow safely, follow the 15:15 rule:  · Take 15 grams of a fast-acting carb (carbohydrate). Talk with your doctor about how much you should take.  · Always keep a source of fast-acting carb with you, such as:  ? Sugar tablets (glucose pills). Take 3-4 pills.  ? 6-8 pieces of hard candy.  ? 4-6 oz (120-150 mL) of fruit juice.  ? 4-6 oz (120-150 mL) of regular (not diet) soda.  ? 1 Tbsp (15 mL) honey or sugar.  · Check your blood sugar 15 minutes after you take the carb.  · If your blood sugar is still at or below 70 mg/dL (3.9 mmol/L), take 15 grams of a carb again.  · If your blood sugar does not go above 70 mg/dL (3.9 mmol/L) after 3 tries, get help right  away.  · After your blood sugar goes back to normal, eat a meal or a snack within 1 hour.    Treating very low blood sugar  If your blood sugar is at or below 54 mg/dL (3 mmol/L), you have very low blood sugar (severe hypoglycemia). This may also cause:  · Passing out.  · Jerky movements you cannot control (seizure).  · Losing consciousness (coma).  This is an emergency. Do not wait to see if the symptoms will go away. Get medical help right away. Call your local emergency services (911 in the [place] Do not drive yourself to the hospital.  If you have very low blood sugar and you cannot eat or drink, you may need a glucagon shot (injection). A family member or friend should learn how to check your blood sugar and how to give you a glucagon shot. Ask your doctor if you need to have a glucagon shot kit at home.  Follow these instructions at home:  General instructions  · Take over-the-counter and prescription medicines only as told by your doctor.  · Stay aware of your blood sugar as told by your doctor.  · Limit alcohol intake to no more than 1 drink a day for nonpregnant women and 2 drinks a day for men. One drink equals 12 oz of beer (355 mL), 5 oz of wine (148 mL), or 1½ oz of hard liquor (44 mL).  · Keep all follow-up visits as told by your doctor. This is important.  If you have diabetes:    · Follow your diabetes care plan as told by your doctor. Make sure you:  ? Know the signs of low blood sugar.  ? Take your medicines as told.  ? Follow your exercise and meal plan.  ? Eat on time. Do not skip meals.  ? Check your blood sugar as often as told by your doctor. Always check it before and after exercise.  ? Follow your sick day plan when you cannot eat or drink normally. Make this plan ahead of time with your doctor.  · Share your diabetes care plan with:  ? Your work or school.  ? People you live with.  · Check your pee (urine) for ketones:  ? When you are sick.  ? As told by your doctor.  · Carry a card or wear  phyllis that says you have diabetes.  Contact a doctor if:  · You have trouble keeping your blood sugar in your target range.  · You have low blood sugar often.  Get help right away if:  · You still have symptoms after you eat or drink something sugary.  · Your blood sugar is at or below 54 mg/dL (3 mmol/L).  · You have jerky movements that you cannot control.  · You pass out.  These symptoms may be an emergency. Do not wait to see if the symptoms will go away. Get medical help right away. Call your local emergency services (911 in the [place] Do not drive yourself to the hospital.  Summary  · Hypoglycemia happens when the level of sugar (glucose) in your blood is too low.  · Low blood sugar can happen to people who have diabetes and people who do not have diabetes. Low blood sugar can happen quickly, and it can be an emergency.  · Make sure you know the signs of low blood sugar and know how to treat it.  · Always keep a source of sugar (fast-acting carb) with you to treat low blood sugar.  This information is not intended to replace advice given to you by your health care provider. Make sure you discuss any questions you have with your health care provider.  Document Released: 03/14/2011 Document Revised: 06/11/2019 Document Reviewed: 01/20/2017  [employer] Interactive Patient Education © 2020 [employer]

## 2020-02-28 NOTE — PROGRESS NOTES
"Ketty Sepulveda is a 55 y.o. male is here today for follow-up.  Chief Complaint   Patient presents with   • Diabetes     recent labs bg 1x daily     • Vitamin D Deficiency     /80 (BP Location: Left arm, Patient Position: Sitting, Cuff Size: Adult)   Ht 180.3 cm (70.98\")   Wt 88.5 kg (195 lb)   BMI 27.21 kg/m²   Current Outpatient Medications on File Prior to Visit   Medication Sig   • Blood Glucose Monitoring Suppl (ONETOUCH VERIO FLEX SYSTEM) w/Device kit 1 Device Daily.   • ergocalciferol (ERGOCALCIFEROL) 1.25 MG (67336 UT) capsule Take 1 capsule by mouth 3 (Three) Times a Week.   • Insulin Glargine (BASAGLAR KWIKPEN) 100 UNIT/ML injection pen See pharmacy instructions   • JANUMET XR  MG tablet Take 2 tablets by mouth Daily With Dinner.   • ONETOUCH VERIO test strip USE TO CHECK BLOOD GLUCOSE TID   • pioglitazone (ACTOS) 45 MG tablet Take 1 tablet by mouth Daily.   • ramipril (ALTACE) 5 MG capsule 2 tablets in the morning and one in the evening   • rosuvastatin (CRESTOR) 40 MG tablet Take 1 tablet by mouth Daily.   • STEGLATRO 15 MG tablet Take 1 tablet every morning   • VASCEPA 1 g capsule capsule 2 capsules twice daily     No current facility-administered medications on file prior to visit.      Family History   Problem Relation Age of Onset   • No Known Problems Mother    • No Known Problems Father      Social History     Tobacco Use   • Smoking status: Never Smoker   • Smokeless tobacco: Never Used   Substance Use Topics   • Alcohol use: No   • Drug use: Defer     No Known Allergies      History of Present Illness   Encounter Diagnoses   Name Primary?   • Atherogenic dyslipidemia    • Essential hypertension    • Vitamin D deficiency    • Type 2 diabetes mellitus with hyperglycemia, with long-term current use of insulin (CMS/[facility]) Yes   This is a 55-year-old male patient here today as an acute visit following a hospital ER evaluation.  He presents angry and frustrated at today's " visit.  He is called and emailed multiple times and states he has not have return calls.  According to his medical record he has had return calls in which he was gien an appointment with me.  He is very frustrated because he had a low blood sugar in the 40 range after taking basal glargine 30 units.  He did not bring his blood glucose meter in to today's visit.  He has quit taking the Basaglar as result of having lows.  He is very fearful of low blood sugars at this time.  We discussed taking a smaller amount if needed.  He is to email me his blood sugars so we can target his morning blood sugars to less than 110 mg/dL.  Currently his blood sugars have been 99-1 14.  His A1c has improved to 7.9 from over 8.  He has been to the emergency room 4 times for anxiety, hypertensive crises, and shortness of breath.  He was asked to follow-up with his endocrinologist regarding possibility of pheochromocytma.  He will have additional testing today for catecholamines.  Dr. Morillo was consulted on what test to be done.  His medical records are available in care everywhere.  His blood pressures in satisfactory range at today's visit.  His medication list was reviewed and updated.      The following portions of the patient's history were reviewed and updated as appropriate: allergies, current medications, past family history, past medical history, past social history, past surgical history and problem list.    Review of Systems   Constitutional: Negative for appetite change and fatigue.   Eyes: Positive for visual disturbance.        Vision more blurred due to for eye exam   Respiratory: Negative for cough.    Cardiovascular: Positive for leg swelling (some).        Good in morning and increases during the day due to gravity   Gastrointestinal: Negative for constipation and diarrhea.   Endocrine: Negative for cold intolerance, heat intolerance, polydipsia, polyphagia and polyuria.   Genitourinary: Negative for frequency.    Neurological: Positive for numbness (both legs).        Neuropathy in feet       Objective   Physical Exam   Constitutional: He is oriented to person, place, and time. He appears well-developed and well-nourished. No distress.   HENT:   Head: Normocephalic and atraumatic.   Right Ear: External ear normal.   Left Ear: External ear normal.   Nose: Nose normal.   Mouth/Throat: Oropharynx is clear and moist. No oropharyngeal exudate.   Eyes: Pupils are equal, round, and reactive to light. Conjunctivae and EOM are normal. Right eye exhibits no discharge. Left eye exhibits no discharge. No scleral icterus.   Neck: Normal range of motion. Neck supple. No JVD present. No tracheal deviation present. No thyromegaly present.   Cardiovascular: Normal rate, regular rhythm and intact distal pulses. Exam reveals no gallop and no friction rub.   Murmur heard.  Pulmonary/Chest: Effort normal and breath sounds normal. No stridor. No respiratory distress. He has no wheezes. He has no rales. He exhibits no tenderness.   Abdominal: Soft. Bowel sounds are normal. He exhibits no distension and no mass. There is no tenderness. There is no rebound and no guarding. No hernia.   Musculoskeletal: Normal range of motion. He exhibits no edema, tenderness or deformity.   Lymphadenopathy:     He has no cervical adenopathy.   Neurological: He is alert and oriented to person, place, and time. He displays normal reflexes. No cranial nerve deficit or sensory deficit. He exhibits normal muscle tone. Coordination normal.   Skin: Skin is warm and dry. No rash noted. He is not diaphoretic. No erythema. No pallor.   Acanthosis nigricans around the lower part of his face and around his neck more prominent anteriorly.   Psychiatric: He has a normal mood and affect. His behavior is normal. Judgment and thought content normal.   Nursing note and vitals reviewed.      Results for orders placed or performed in visit on 02/19/20   Comprehensive Metabolic Panel    Result Value Ref Range    Glucose 147 (H) 65 - 99 mg/dL    BUN 15 6 - 20 mg/dL    Creatinine 0.90 0.76 - 1.27 mg/dL    eGFR Non African Am 87 >60 mL/min/1.73    eGFR African Am 106 >60 mL/min/1.73    BUN/Creatinine Ratio 16.7 7.0 - 25.0    Sodium 139 136 - 145 mmol/L    Potassium 4.2 3.5 - 5.2 mmol/L    Chloride 103 98 - 107 mmol/L    Total CO2 21.5 (L) 22.0 - 29.0 mmol/L    Calcium 8.8 8.6 - 10.5 mg/dL    Total Protein 7.1 6.0 - 8.5 g/dL    Albumin 4.30 3.50 - 5.20 g/dL    Globulin 2.8 gm/dL    A/G Ratio 1.5 g/dL    Total Bilirubin 0.6 0.2 - 1.2 mg/dL    Alkaline Phosphatase 53 39 - 117 U/L    AST (SGOT) 14 1 - 40 U/L    ALT (SGPT) 25 1 - 41 U/L   Lipid Panel   Result Value Ref Range    Total Cholesterol 108 0 - 200 mg/dL    Triglycerides 67 0 - 150 mg/dL    HDL Cholesterol 50 40 - 60 mg/dL    VLDL Cholesterol 13.4 mg/dL    LDL Cholesterol  45 0 - 100 mg/dL   C-Peptide   Result Value Ref Range    C-Peptide 1.8 1.1 - 4.4 ng/mL   Hemoglobin A1c   Result Value Ref Range    Hemoglobin A1C 7.90 (H) 4.80 - 5.60 %   Vitamin D 25 Hydroxy   Result Value Ref Range    25 Hydroxy, Vitamin D 85.9 30.0 - 100.0 ng/ml   Cardiovascular Risk Assessment   Result Value Ref Range    Interpretation Note    Diabetes Patient Education   Result Value Ref Range    PDF Image Not applicable        Assessment/Plan   Problems Addressed this Visit        Cardiovascular and Mediastinum    Essential hypertension       Digestive    Vitamin D deficiency       Endocrine    Type 2 diabetes mellitus with hyperglycemia, with long-term current use of insulin (CMS/[facility]) - Primary       Other    Atherogenic dyslipidemia        Patient was seen and examined.  He has a very thick accent which can make it difficult to understand him.  He was very anxious and frustrated at today's visit due to the lack of communication with him from this office.  He has been emailing Dr. Morillo and it was shown that he did have return emails.  He was told by the emergency room  he needed to be seen within 2 days for possibility of Pheochromocytoma.  He will have additional work-up at today's visit.  He will follow-up in 3 months with Dr. Morillo.  His A1c has improved.  He is to email me his blood sugars to that we can make changes if needed.  He was given a sample of Basaglar.  He had to throw his last basal bar away due to putting it on ice and freezing it.  He states his refrigerator broke and he was afraid to not keep the insulin code.  He has been advised to bring his blood glucose meter with him to each visit.  He was educated today regarding treatment of hypoglycemia.  He has had a significant emergent event of when he had to call EMS for a blood sugar in the 40 range.  He was treated with a coke however was not transported to the hospital.      email blood sugars readings include date and time  We may restart a lower dose of basaglar  Schedule an eye doctor appt            Hypoglycemia  Hypoglycemia is when the sugar (glucose) level in your blood is too low. Signs of low blood sugar may include:  · Feeling:  ? Hungry.  ? Worried or nervous (anxious).  ? Sweaty and clammy.  ? Confused.  ? Dizzy.  ? Sleepy.  ? Sick to your stomach (nauseous).  · Having:  ? A fast heartbeat.  ? A headache.  ? A change in your vision.  ? Tingling or no feeling (numbness) around your mouth, lips, or tongue.  ? Jerky movements that you cannot control (seizure).  · Having trouble with:  ? Moving (coordination).  ? Sleeping.  ? Passing out (fainting).  ? Getting upset easily (irritability).  Low blood sugar can happen to people who have diabetes and people who do not have diabetes. Low blood sugar can happen quickly, and it can be an emergency.  Treating low blood sugar  Low blood sugar is often treated by eating or drinking something sugary right away, such as:  · Fruit juice, 4-6 oz (120-150 mL).  · Regular soda (not diet soda), 4-6 oz (120-150 mL).  · Low-fat milk, 4 oz (120 mL).  · Several pieces of hard  candy.  · Sugar or honey, 1 Tbsp (15 mL).  Treating low blood sugar if you have diabetes  If you can think clearly and swallow safely, follow the 15:15 rule:  · Take 15 grams of a fast-acting carb (carbohydrate). Talk with your doctor about how much you should take.  · Always keep a source of fast-acting carb with you, such as:  ? Sugar tablets (glucose pills). Take 3-4 pills.  ? 6-8 pieces of hard candy.  ? 4-6 oz (120-150 mL) of fruit juice.  ? 4-6 oz (120-150 mL) of regular (not diet) soda.  ? 1 Tbsp (15 mL) honey or sugar.  · Check your blood sugar 15 minutes after you take the carb.  · If your blood sugar is still at or below 70 mg/dL (3.9 mmol/L), take 15 grams of a carb again.  · If your blood sugar does not go above 70 mg/dL (3.9 mmol/L) after 3 tries, get help right away.  · After your blood sugar goes back to normal, eat a meal or a snack within 1 hour.    Treating very low blood sugar  If your blood sugar is at or below 54 mg/dL (3 mmol/L), you have very low blood sugar (severe hypoglycemia). This may also cause:  · Passing out.  · Jerky movements you cannot control (seizure).  · Losing consciousness (coma).  This is an emergency. Do not wait to see if the symptoms will go away. Get medical help right away. Call your local emergency services (911 in the [place] Do not drive yourself to the hospital.  If you have very low blood sugar and you cannot eat or drink, you may need a glucagon shot (injection). A family member or friend should learn how to check your blood sugar and how to give you a glucagon shot. Ask your doctor if you need to have a glucagon shot kit at home.  Follow these instructions at home:  General instructions  · Take over-the-counter and prescription medicines only as told by your doctor.  · Stay aware of your blood sugar as told by your doctor.  · Limit alcohol intake to no more than 1 drink a day for nonpregnant women and 2 drinks a day for men. One drink equals 12 oz of beer (355 mL),  5 oz of wine (148 mL), or 1½ oz of hard liquor (44 mL).  · Keep all follow-up visits as told by your doctor. This is important.  If you have diabetes:    · Follow your diabetes care plan as told by your doctor. Make sure you:  ? Know the signs of low blood sugar.  ? Take your medicines as told.  ? Follow your exercise and meal plan.  ? Eat on time. Do not skip meals.  ? Check your blood sugar as often as told by your doctor. Always check it before and after exercise.  ? Follow your sick day plan when you cannot eat or drink normally. Make this plan ahead of time with your doctor.  · Share your diabetes care plan with:  ? Your work or school.  ? People you live with.  · Check your pee (urine) for ketones:  ? When you are sick.  ? As told by your doctor.  · Carry a card or wear jewelry that says you have diabetes.  Contact a doctor if:  · You have trouble keeping your blood sugar in your target range.  · You have low blood sugar often.  Get help right away if:  · You still have symptoms after you eat or drink something sugary.  · Your blood sugar is at or below 54 mg/dL (3 mmol/L).  · You have jerky movements that you cannot control.  · You pass out.  These symptoms may be an emergency. Do not wait to see if the symptoms will go away. Get medical help right away. Call your local emergency services (911 in the [place] Do not drive yourself to the hospital.  Summary  · Hypoglycemia happens when the level of sugar (glucose) in your blood is too low.  · Low blood sugar can happen to people who have diabetes and people who do not have diabetes. Low blood sugar can happen quickly, and it can be an emergency.  · Make sure you know the signs of low blood sugar and know how to treat it.  · Always keep a source of sugar (fast-acting carb) with you to treat low blood sugar.  This information is not intended to replace advice given to you by your health care provider. Make sure you discuss any questions you have with your health  care provider.  Document Released: 03/14/2011 Document Revised: 06/11/2019 Document Reviewed: 01/20/2017  Elsevier Interactive Patient Education © 2020 [employer]

## 2020-03-05 LAB
CREAT UR-MCNC: 39.3 MG/DL
DOPAMINE SERPL-MCNC: 35 PG/ML (ref 0–48)
EPINEPH PLAS-MCNC: 18 PG/ML (ref 0–62)
METANEPH FREE SERPL-MCNC: 33 PG/ML (ref 0–62)
METANEPHS 24H UR-MCNC: 43 UG/L
METANEPHS/CREAT UR: 0.3 UG/MG CREAT (ref 0–1)
NOREPINEPH PLAS-MCNC: 243 PG/ML (ref 0–874)
NORMETANEPHRINE 24H UR-MCNC: 64 UG/L
NORMETANEPHRINE SERPL-MCNC: 64 PG/ML (ref 0–145)

## 2020-03-10 ENCOUNTER — TELEPHONE (OUTPATIENT)
Dept: ENDOCRINOLOGY | Age: 55
End: 2020-03-10

## 2020-03-11 LAB
CREATINE 24H UR-MRATE: 5 MG/24 HR (ref 0–40)
CREATINE UR-MCNC: 0.2 MG/DL
METANEPH 24H UR-MRATE: 158 UG/24 HR (ref 45–290)
METANEPHS 24H UR-MCNC: 62 UG/L
NORMETANEPHRINE 24H UR-MCNC: 132 UG/L
NORMETANEPHRINE 24H UR-MRATE: 337 UG/24 HR (ref 82–500)

## 2020-03-30 RX ORDER — ERGOCALCIFEROL 1.25 MG/1
CAPSULE ORAL
Qty: 39 CAPSULE | Refills: 3 | Status: SHIPPED | OUTPATIENT
Start: 2020-03-30 | End: 2020-07-29 | Stop reason: SDUPTHER

## 2020-05-19 ENCOUNTER — RESULTS ENCOUNTER (OUTPATIENT)
Dept: ENDOCRINOLOGY | Age: 55
End: 2020-05-19

## 2020-05-19 DIAGNOSIS — R68.82 DECREASED LIBIDO: ICD-10-CM

## 2020-05-19 DIAGNOSIS — E78.5 ATHEROGENIC DYSLIPIDEMIA: ICD-10-CM

## 2020-05-19 DIAGNOSIS — E11.9 TYPE 2 DIABETES MELLITUS WITHOUT COMPLICATION, WITHOUT LONG-TERM CURRENT USE OF INSULIN (HCC): ICD-10-CM

## 2020-05-19 DIAGNOSIS — E55.9 VITAMIN D DEFICIENCY: ICD-10-CM

## 2020-05-19 DIAGNOSIS — I10 ESSENTIAL HYPERTENSION: ICD-10-CM

## 2020-07-29 RX ORDER — ERGOCALCIFEROL 1.25 MG/1
50000 CAPSULE ORAL
Qty: 4 CAPSULE | Refills: 3 | Status: SHIPPED | OUTPATIENT
Start: 2020-07-29 | End: 2020-08-14 | Stop reason: SDUPTHER

## 2020-08-01 LAB
25(OH)D3+25(OH)D2 SERPL-MCNC: 51.5 NG/ML (ref 30–100)
ALBUMIN SERPL-MCNC: 4.2 G/DL (ref 3.8–4.9)
ALBUMIN/GLOB SERPL: 1.6 {RATIO} (ref 1.2–2.2)
ALP SERPL-CCNC: 51 IU/L (ref 39–117)
ALT SERPL-CCNC: 19 IU/L (ref 0–44)
AST SERPL-CCNC: 16 IU/L (ref 0–40)
BILIRUB SERPL-MCNC: 0.4 MG/DL (ref 0–1.2)
BUN SERPL-MCNC: 16 MG/DL (ref 6–24)
BUN/CREAT SERPL: 17 (ref 9–20)
C PEPTIDE SERPL-MCNC: 1.3 NG/ML (ref 1.1–4.4)
CALCIUM SERPL-MCNC: 8.5 MG/DL (ref 8.7–10.2)
CHLORIDE SERPL-SCNC: 103 MMOL/L (ref 96–106)
CHOLEST SERPL-MCNC: NORMAL MG/DL
CO2 SERPL-SCNC: 24 MMOL/L (ref 20–29)
CREAT SERPL-MCNC: 0.95 MG/DL (ref 0.76–1.27)
GLOBULIN SER CALC-MCNC: 2.6 G/DL (ref 1.5–4.5)
GLUCOSE SERPL-MCNC: 147 MG/DL (ref 65–99)
HBA1C MFR BLD: 8.7 % (ref 4.8–5.6)
HDL SERPL-SCNC: NORMAL NMOL/L
HDLC SERPL-MCNC: NORMAL MG/DL
LDL SERPL-SCNC: NORMAL NMOL/L
MICROALBUMIN UR-MCNC: 26.7 UG/ML
POTASSIUM SERPL-SCNC: 4.4 MMOL/L (ref 3.5–5.2)
PROT SERPL-MCNC: 6.8 G/DL (ref 6–8.5)
SODIUM SERPL-SCNC: 137 MMOL/L (ref 134–144)
SPECIMEN STATUS: NORMAL
T4 SERPL-MCNC: 9 UG/DL (ref 4.5–12)
TRIGL SERPL-MCNC: NORMAL MG/DL
TSH SERPL DL<=0.005 MIU/L-ACNC: 0.95 UIU/ML (ref 0.45–4.5)
URATE SERPL-MCNC: 3.7 MG/DL (ref 3.7–8.6)

## 2020-08-10 RX ORDER — INSULIN GLARGINE 100 [IU]/ML
INJECTION, SOLUTION SUBCUTANEOUS
Qty: 18 ML | Refills: 1 | Status: SHIPPED | OUTPATIENT
Start: 2020-08-10 | End: 2021-03-08 | Stop reason: SDUPTHER

## 2020-08-14 ENCOUNTER — OFFICE VISIT (OUTPATIENT)
Dept: ENDOCRINOLOGY | Age: 55
End: 2020-08-14

## 2020-08-14 VITALS
HEIGHT: 71 IN | DIASTOLIC BLOOD PRESSURE: 70 MMHG | SYSTOLIC BLOOD PRESSURE: 120 MMHG | BODY MASS INDEX: 27.89 KG/M2 | RESPIRATION RATE: 16 BRPM | WEIGHT: 199.2 LBS

## 2020-08-14 DIAGNOSIS — E55.9 VITAMIN D DEFICIENCY: ICD-10-CM

## 2020-08-14 DIAGNOSIS — E11.65 TYPE 2 DIABETES MELLITUS WITH HYPERGLYCEMIA, WITH LONG-TERM CURRENT USE OF INSULIN (HCC): Primary | ICD-10-CM

## 2020-08-14 DIAGNOSIS — E78.5 ATHEROGENIC DYSLIPIDEMIA: ICD-10-CM

## 2020-08-14 DIAGNOSIS — R79.89 LOW TESTOSTERONE IN MALE: ICD-10-CM

## 2020-08-14 DIAGNOSIS — I10 ESSENTIAL HYPERTENSION: ICD-10-CM

## 2020-08-14 DIAGNOSIS — Z79.4 TYPE 2 DIABETES MELLITUS WITH HYPERGLYCEMIA, WITH LONG-TERM CURRENT USE OF INSULIN (HCC): Primary | ICD-10-CM

## 2020-08-14 DIAGNOSIS — R68.82 DECREASED LIBIDO: ICD-10-CM

## 2020-08-14 PROCEDURE — 99215 OFFICE O/P EST HI 40 MIN: CPT | Performed by: INTERNAL MEDICINE

## 2020-08-14 RX ORDER — SITAGLIPTIN AND METFORMIN HYDROCHLORIDE 1000; 50 MG/1; MG/1
2 TABLET, FILM COATED, EXTENDED RELEASE ORAL
Qty: 180 TABLET | Refills: 3 | Status: SHIPPED | OUTPATIENT
Start: 2020-08-14 | End: 2021-03-12 | Stop reason: SDUPTHER

## 2020-08-14 RX ORDER — ERGOCALCIFEROL 1.25 MG/1
50000 CAPSULE ORAL
Qty: 13 CAPSULE | Refills: 3 | Status: SHIPPED | OUTPATIENT
Start: 2020-08-14

## 2020-08-14 RX ORDER — ROSUVASTATIN CALCIUM 40 MG/1
40 TABLET, COATED ORAL DAILY
Qty: 90 TABLET | Refills: 3 | Status: SHIPPED | OUTPATIENT
Start: 2020-08-14 | End: 2020-08-17

## 2020-08-14 RX ORDER — TESTOSTERONE UNDECANOATE 237 MG/1
1 CAPSULE, LIQUID FILLED ORAL 2 TIMES DAILY
Qty: 60 CAPSULE | Refills: 5 | Status: SHIPPED | OUTPATIENT
Start: 2020-08-14

## 2020-08-14 RX ORDER — ERTUGLIFLOZIN 15 MG/1
TABLET, FILM COATED ORAL
Qty: 90 TABLET | Refills: 3 | Status: SHIPPED | OUTPATIENT
Start: 2020-08-14

## 2020-08-14 RX ORDER — PIOGLITAZONEHYDROCHLORIDE 45 MG/1
45 TABLET ORAL DAILY
Qty: 90 TABLET | Refills: 3 | Status: SHIPPED | OUTPATIENT
Start: 2020-08-14 | End: 2020-08-17

## 2020-08-14 RX ORDER — ICOSAPENT ETHYL 1000 MG/1
CAPSULE ORAL
Qty: 360 CAPSULE | Refills: 3 | Status: SHIPPED | OUTPATIENT
Start: 2020-08-14

## 2020-08-14 NOTE — PROGRESS NOTES
"Ketty Sepulveda is a 55 y.o. male seen for follow up for Diabetes; Vitamin D Deficiency; Hypertension; and lab review  He denies any problems or concerns. He is checking BG once a day fasting. No BG readings.       History of Present Illness this is a 55-year-old gentleman known patient with type 2 diabetes hypertension and dyslipidemia as well as vitamin D deficiency.  Over the course of last 6 months he has had no significant health problem for which he was in the ER or hospital.  However he has had shoulder pain and has had treatment and has raised his blood sugar.  Additionally he is not able to inject himself with testosterone and he is complaining of having virtually no sex drive.    The following portions of the patient's history were reviewed and updated as appropriate: allergies, current medications, past family history, past medical history, past social history, past surgical history and problem list.      /70   Resp 16   Ht 180.3 cm (70.98\")   Wt 90.4 kg (199 lb 3.2 oz)   BMI 27.80 kg/m²      No Known Allergies       Current Outpatient Medications:   •  Blood Glucose Monitoring Suppl (ONETOUCH VERIO FLEX SYSTEM) w/Device kit, 1 Device Daily., Disp: 1 kit, Rfl: 0  •  Insulin Glargine (BASAGLAR KWIKPEN) 100 UNIT/ML injection pen, INJECT 40 UNITS AND EVERY 3 DAYS, IF FASTING BLOOD GLUCOSE IS GREATER THAN 110 INCREASE THE DOSE BY 2 UNITS EVERY MORNING, Disp: 18 mL, Rfl: 1  •  JANUMET XR  MG tablet, Take 2 tablets by mouth Daily With Dinner., Disp: 180 tablet, Rfl: 3  •  ONETOUCH VERIO test strip, USE TO CHECK BLOOD GLUCOSE TID, Disp: , Rfl: 5  •  pioglitazone (ACTOS) 45 MG tablet, Take 1 tablet by mouth Daily., Disp: 90 tablet, Rfl: 3  •  ramipril (ALTACE) 5 MG capsule, 2 tablets in the morning and one in the evening, Disp: 270 capsule, Rfl: 3  •  rosuvastatin (CRESTOR) 40 MG tablet, Take 1 tablet by mouth Daily., Disp: 90 tablet, Rfl: 3  •  STEGLATRO 15 MG tablet, Take 1 tablet " every morning, Disp: 90 tablet, Rfl: 1  •  VASCEPA 1 g capsule capsule, 2 capsules twice daily, Disp: 360 capsule, Rfl: 3  •  vitamin D (ERGOCALCIFEROL) 1.25 MG (07061 UT) capsule capsule, Take 1 capsule by mouth Every 7 (Seven) Days., Disp: 4 capsule, Rfl: 3     Review of Systems   Constitutional: Negative.    HENT: Negative.    Eyes: Negative.    Respiratory: Negative.    Cardiovascular: Negative.    Gastrointestinal: Negative.    Endocrine: Negative.    Genitourinary: Negative.    Musculoskeletal: Negative.    Skin: Negative.    Allergic/Immunologic: Negative.    Neurological: Negative.    Hematological: Negative.    Psychiatric/Behavioral: Negative.    The above review of system was reviewed, corroborated and accepted    Objective   Physical Exam   Constitutional: He is oriented to person, place, and time. He appears well-developed and well-nourished. No distress.   HENT:   Head: Normocephalic and atraumatic.   Right Ear: External ear normal.   Left Ear: External ear normal.   Nose: Nose normal.   Mouth/Throat: Oropharynx is clear and moist. No oropharyngeal exudate.   Eyes: Pupils are equal, round, and reactive to light. Conjunctivae and EOM are normal. Right eye exhibits no discharge. Left eye exhibits no discharge. No scleral icterus.   Neck: Normal range of motion. Neck supple. No JVD present. No tracheal deviation present. No thyromegaly present.   Cardiovascular: Normal rate, regular rhythm, normal heart sounds and intact distal pulses. Exam reveals no gallop and no friction rub.   No murmur heard.  Pulmonary/Chest: Effort normal and breath sounds normal. No stridor. No respiratory distress. He has no wheezes. He has no rales. He exhibits no tenderness.   Abdominal: Soft. Bowel sounds are normal. He exhibits no distension and no mass. There is no tenderness. There is no rebound and no guarding. No hernia.   Musculoskeletal: Normal range of motion. He exhibits no edema, tenderness or deformity.    Lymphadenopathy:     He has no cervical adenopathy.   Neurological: He is alert and oriented to person, place, and time. He displays normal reflexes. No cranial nerve deficit or sensory deficit. He exhibits normal muscle tone. Coordination normal.   Skin: Skin is warm and dry. No rash noted. He is not diaphoretic. No erythema. No pallor.   Acanthosis nigricans around the lower part of his face and around his neck more prominent anteriorly.   Psychiatric: He has a normal mood and affect. His behavior is normal. Judgment and thought content normal.   Nursing note and vitals reviewed.    No significant change since 12/13/2019 office visit.  Results for orders placed or performed in visit on 05/19/20   T4 & TSH ([facility])   Result Value Ref Range    TSH 0.954 0.450 - 4.500 uIU/mL    T4, Total 9.0 4.5 - 12.0 ug/dL   Uric Acid   Result Value Ref Range    Uric Acid 3.7 3.7 - 8.6 mg/dL   Vitamin D 25 Hydroxy   Result Value Ref Range    25 Hydroxy, Vitamin D 51.5 30.0 - 100.0 ng/mL   Comprehensive Metabolic Panel   Result Value Ref Range    Glucose 147 (H) 65 - 99 mg/dL    BUN 16 6 - 24 mg/dL    Creatinine 0.95 0.76 - 1.27 mg/dL    eGFR Non African Am 89 >59 mL/min/1.73    eGFR African Am 103 >59 mL/min/1.73    BUN/Creatinine Ratio 17 9 - 20    Sodium 137 134 - 144 mmol/L    Potassium 4.4 3.5 - 5.2 mmol/L    Chloride 103 96 - 106 mmol/L    Total CO2 24 20 - 29 mmol/L    Calcium 8.5 (L) 8.7 - 10.2 mg/dL    Total Protein 6.8 6.0 - 8.5 g/dL    Albumin 4.2 3.8 - 4.9 g/dL    Globulin 2.6 1.5 - 4.5 g/dL    A/G Ratio 1.6 1.2 - 2.2    Total Bilirubin 0.4 0.0 - 1.2 mg/dL    Alkaline Phosphatase 51 39 - 117 IU/L    AST (SGOT) 16 0 - 40 IU/L    ALT (SGPT) 19 0 - 44 IU/L   C-Peptide   Result Value Ref Range    C-Peptide 1.3 1.1 - 4.4 ng/mL   Hemoglobin A1c   Result Value Ref Range    Hemoglobin A1C 8.7 (H) 4.8 - 5.6 %   MicroAlbumin, Urine, Random - Urine, Clean Catch   Result Value Ref Range    Microalbumin, Urine 26.7 Not Estab.  ug/mL   NMR LipoProfile   Result Value Ref Range    LDL-P CANCELED nmol/L    HDL-C CANCELED     Triglycerides CANCELED     Total Cholesterol CANCELED     HDL-P (Total) CANCELED    Specimen Status Report   Result Value Ref Range    Specimen Status CANCELED         Assessment/Plan   Wyatt was seen today for diabetes, vitamin d deficiency, hypertension and lab review.    Diagnoses and all orders for this visit:    Type 2 diabetes mellitus with hyperglycemia, with long-term current use of insulin (CMS/[facility])  -     T4 & TSH ([facility]); Future  -     Uric Acid; Future  -     Vitamin D 25 Hydroxy; Future  -     Comprehensive Metabolic Panel; Future  -     C-Peptide; Future  -     Hemoglobin A1c; Future  -     MicroAlbumin, Urine, Random - Urine, Clean Catch; Future  -     NMR LipoProfile; Future  -     TestT+TestF+SHBG; Future  -     Hemoglobin & Hematocrit, Blood; Future    Essential hypertension  -     T4 & TSH ([facility]); Future  -     Uric Acid; Future  -     Vitamin D 25 Hydroxy; Future  -     Comprehensive Metabolic Panel; Future  -     C-Peptide; Future  -     Hemoglobin A1c; Future  -     MicroAlbumin, Urine, Random - Urine, Clean Catch; Future  -     NMR LipoProfile; Future  -     TestT+TestF+SHBG; Future  -     Hemoglobin & Hematocrit, Blood; Future    Vitamin D deficiency  -     T4 & TSH ([facility]); Future  -     Uric Acid; Future  -     Vitamin D 25 Hydroxy; Future  -     Comprehensive Metabolic Panel; Future  -     C-Peptide; Future  -     Hemoglobin A1c; Future  -     MicroAlbumin, Urine, Random - Urine, Clean Catch; Future  -     NMR LipoProfile; Future  -     TestT+TestF+SHBG; Future  -     Hemoglobin & Hematocrit, Blood; Future    Atherogenic dyslipidemia  -     T4 & TSH ([facility]); Future  -     Uric Acid; Future  -     Vitamin D 25 Hydroxy; Future  -     Comprehensive Metabolic Panel; Future  -     C-Peptide; Future  -     Hemoglobin A1c; Future  -     MicroAlbumin, Urine, Random - Urine, Clean Catch;  Future  -     NMR LipoProfile; Future  -     TestT+TestF+SHBG; Future  -     Hemoglobin & Hematocrit, Blood; Future    Decreased libido  -     T4 & TSH ([facility]); Future  -     Uric Acid; Future  -     Vitamin D 25 Hydroxy; Future  -     Comprehensive Metabolic Panel; Future  -     C-Peptide; Future  -     Hemoglobin A1c; Future  -     MicroAlbumin, Urine, Random - Urine, Clean Catch; Future  -     NMR LipoProfile; Future  -     TestT+TestF+SHBG  -     PSA DIAGNOSTIC  -     CBC & Differential  -     TestT+TestF+SHBG; Future  -     Hemoglobin & Hematocrit, Blood; Future    Low testosterone in male  -     JATENZO 237 MG capsule; Take 1 capsule by mouth 2 (two) times a day.  -     TestT+TestF+SHBG  -     PSA DIAGNOSTIC  -     CBC & Differential  -     TestT+TestF+SHBG; Future  -     Hemoglobin & Hematocrit, Blood; Future    Other orders  -     JANUMET XR  MG tablet; Take 2 tablets by mouth Daily With Dinner.  -     pioglitazone (ACTOS) 45 MG tablet; Take 1 tablet by mouth Daily.  -     rosuvastatin (CRESTOR) 40 MG tablet; Take 1 tablet by mouth Daily.  -     STEGLATRO 15 MG tablet; Take 1 tablet every morning  -     VASCEPA 1 g capsule capsule; 2 capsules twice daily  -     vitamin D (ERGOCALCIFEROL) 1.25 MG (12438 UT) capsule capsule; Take 1 capsule by mouth Every 7 (Seven) Days.        In summary I saw and examined this 55-year-old gentleman for above-mentioned problems.  I reviewed his laboratory evaluation of 7/31/2029 provided him with a hard copy of it.  Aside from hemoglobin A1c of 8.7 he is clinically and metabolically stable.  He promised taking his Basaglar regularly and also for his low testosterone now I am giving him to Jatenzo 237 mg twice weekly.  This office visit with 25 minutes being spent on face-to-face counseling and education on importance of the use of insulin on a regular basis and organizing his plan of care moving forward lasted 40 minutes.  I will see him in 6 months or sooner if  needed with laboratory evaluation prior to each office visit.

## 2020-08-17 LAB
BASOPHILS # BLD AUTO: 0.02 10*3/MM3 (ref 0–0.2)
BASOPHILS NFR BLD AUTO: 0.3 % (ref 0–1.5)
EOSINOPHIL # BLD AUTO: 0.08 10*3/MM3 (ref 0–0.4)
EOSINOPHIL NFR BLD AUTO: 1.3 % (ref 0.3–6.2)
ERYTHROCYTE [DISTWIDTH] IN BLOOD BY AUTOMATED COUNT: 13.6 % (ref 12.3–15.4)
HCT VFR BLD AUTO: 39.7 % (ref 37.5–51)
HGB BLD-MCNC: 13.2 G/DL (ref 13–17.7)
IMM GRANULOCYTES # BLD AUTO: 0.02 10*3/MM3 (ref 0–0.05)
IMM GRANULOCYTES NFR BLD AUTO: 0.3 % (ref 0–0.5)
LYMPHOCYTES # BLD AUTO: 1.38 10*3/MM3 (ref 0.7–3.1)
LYMPHOCYTES NFR BLD AUTO: 22.2 % (ref 19.6–45.3)
MCH RBC QN AUTO: 28.2 PG (ref 26.6–33)
MCHC RBC AUTO-ENTMCNC: 33.2 G/DL (ref 31.5–35.7)
MCV RBC AUTO: 84.8 FL (ref 79–97)
MONOCYTES # BLD AUTO: 0.55 10*3/MM3 (ref 0.1–0.9)
MONOCYTES NFR BLD AUTO: 8.9 % (ref 5–12)
NEUTROPHILS # BLD AUTO: 4.16 10*3/MM3 (ref 1.7–7)
NEUTROPHILS NFR BLD AUTO: 67 % (ref 42.7–76)
NRBC BLD AUTO-RTO: 0 /100 WBC (ref 0–0.2)
PLATELET # BLD AUTO: 206 10*3/MM3 (ref 140–450)
PSA SERPL-MCNC: 1.53 NG/ML (ref 0–4)
RBC # BLD AUTO: 4.68 10*6/MM3 (ref 4.14–5.8)
SHBG SERPL-SCNC: 25.5 NMOL/L (ref 19.3–76.4)
TESTOST FREE SERPL-MCNC: 5.8 PG/ML (ref 7.2–24)
TESTOST SERPL-MCNC: 169 NG/DL (ref 264–916)
WBC # BLD AUTO: 6.21 10*3/MM3 (ref 3.4–10.8)

## 2020-08-17 RX ORDER — PIOGLITAZONEHYDROCHLORIDE 45 MG/1
45 TABLET ORAL DAILY
Qty: 90 TABLET | Refills: 3 | Status: SHIPPED | OUTPATIENT
Start: 2020-08-17

## 2020-08-17 RX ORDER — ROSUVASTATIN CALCIUM 40 MG/1
40 TABLET, COATED ORAL DAILY
Qty: 90 TABLET | Refills: 3 | Status: SHIPPED | OUTPATIENT
Start: 2020-08-17 | End: 2021-08-17

## 2020-08-25 RX ORDER — BLOOD SUGAR DIAGNOSTIC
STRIP MISCELLANEOUS
Qty: 300 EACH | Refills: 0 | Status: SHIPPED | OUTPATIENT
Start: 2020-08-25 | End: 2021-03-24 | Stop reason: SDUPTHER

## 2021-02-04 ENCOUNTER — TREATMENT (OUTPATIENT)
Dept: PHYSICAL THERAPY | Facility: CLINIC | Age: 56
End: 2021-02-04

## 2021-02-04 DIAGNOSIS — N50.812 PAIN IN BOTH TESTICLES: Primary | ICD-10-CM

## 2021-02-04 DIAGNOSIS — M62.89 MUSCLE TIGHTNESS: ICD-10-CM

## 2021-02-04 DIAGNOSIS — N50.811 PAIN IN BOTH TESTICLES: Primary | ICD-10-CM

## 2021-02-04 PROCEDURE — 97162 PT EVAL MOD COMPLEX 30 MIN: CPT | Performed by: PHYSICAL THERAPIST

## 2021-02-04 NOTE — PATIENT INSTRUCTIONS
Access Code: [email or web address]   URL: [email or web address]   Date: 02/04/2021   Prepared by: Maurice Lomas     Exercises   Diaphragmatic Breathing at 90/90 Supported - 10 reps - 2 sets - 2x daily - 7x weekly   Supine Psoas Self-Massage - 10 reps - 3 sets - 1x daily - 7x weekly   Supine Lower Trunk Rotation - 10 reps - 2 sets - 1x daily - 7x weekly

## 2021-02-04 NOTE — PROGRESS NOTES
Physical Therapy Initial Evaluation and Plan of Care      Patient: Wyatt Sepulveda   : 1965  Diagnosis/ICD-10 Code:  Pain in both testicles [N50.811, N50.812]  Referring practitioner: Julieth Gallardo DO  Date of Initial Visit: 2021  Today's Date: 2021          Subjective Evaluation    History of Present Illness  Mechanism of injury: Patient reports that he he has pain under his testicles when he is sitting and begins walking. After a few minutes of walking he feels that his pain gets better. He also has pain with lifting weight from ground level. Patient reports that he has increased pain for several days following intercourse but has no pain during intercourse. He had an ultrasound performed which showed muscle tightness. Patient notes no pain when using the restroom. Patient reports occasional constipation but has no pain when trying to void. Patient notes that his pain is slightly worse on the left side. This issue has been present for several months. Patient notes that he has anxiety dx in the past.     Pain  Current pain ratin  At worst pain ratin  Location: Behind testicles   Quality: squeezing, pulling and tight  Relieving factors: relaxation and rest  Aggravating factors: ambulation, squatting, lifting and stairs    Patient Goals  Patient goals for therapy: decreased pain, increased motion, increased strength, independence with ADLs/IADLs and return to sport/leisure activities             Objective          Postural Observations  Seated posture: good  Standing posture: good        Palpation   Left   Hypertonic in the iliopsoas.   Tenderness of the iliopsoas.     Right   Hypertonic in the iliopsoas. Tenderness of the iliopsoas.     Additional Palpation Details  Palpation and pressure to psoas (B) reproduced testicular pian   Left more tender than R (also more restricted)     Neurological Testing     Sensation     Lumbar   Left   Intact: light touch    Right   Intact: light  touch    Additional Neurological Details  Patient reports occasional numbness at anterior shins (B)    Active Range of Motion     Lumbar   Flexion: Active lumbar flexion: 75% to lower 1/3 shin.   Extension: Active lumbar extension: 75% to upper 1/3 posterior thigh.   Left lateral flexion: Active left lumbar lateral flexion: 75%   Right lateral flexion: Active right lumbar lateral flexion: 75%     Additional Active Range of Motion Details  No AROM of lumbar spine reproduced testicular pain    Hip flexion (L) reproduced testicular pain past 90 deg      Passive Range of Motion     Additional Passive Range of Motion Details  UPA / CPA to L1/2/3 reproduced testicular pain L2/3 was most closely related to CC  Patients pain dissipated and resolved with oscillations to this region     Strength/Myotome Testing     Left Hip   Planes of Motion   Flexion: WFL    Right Hip   Planes of Motion   Flexion: WFL    Left Knee   Flexion: WFL  Extension: WFL    Right Knee   Flexion: WFL  Extension: WFL    Left Ankle/Foot   Dorsiflexion: WFL.   Plantar flexion: WFL.     Right Ankle/Foot   Dorsiflexion: WFL.   Plantar flexion: WFL.     Additional Strength Details  Noted no neurological weakness in patient's legs (B)    Muscle Activation   Patient unable to activate left transverse abdominals and right transverse abdominals.     Tests     Lumbar     Left   Negative quadrant.     Right   Negative quadrant.      General Comments     Lumbar Comments  Testicular pain reproduced with   L hip flexion past 90 deg   Palpation of psoas muscle (B) L>R  And UPA/ CPA to upper lumbar spine in prone     Significant PAIVM restrictions in thoracic spine   Possible psoas restriction being triggered after sitting and moving into standing/ gait that is generating symptoms.          Assessment & Plan     Assessment  Impairments: abnormal muscle firing, abnormal muscle tone, abnormal or restricted ROM, activity intolerance, impaired balance, lacks appropriate  home exercise program and pain with function  Assessment details: Patient presents with testicular pain that has been persistent for several months. Patient has not found relief and has been told by multiple providers that physical therapy intervention is needed. PT able to reproduce pain with active motion (sit to stand/ gait) Passive physiological motion (Hip flexion), and passive accessory motion (UPA/ CPA/ psoas palpation). After brief treatment to these areas patient no longer had pain with sit to stand or walking. This patient is a good candidate for PT and would benefit from skilled PT services in order to address listed impairments and increase tolerance to normal daily activities including ADLs, work and recreational activities.    Prognosis: good  Prognosis details: GOALS  3 weeks. Pt will:  1 Patient will be (I) with home exercise program  2. Patient will report >50% reduction in pain symptoms during sit to stand transition  3. Patient will report >/= 50% reduction in pain during initial 20' of gait following sitting  4. Patient will report daily pain symptoms to <5/10    6 weeks. Pt will:  1 Patient will be (I) with long term exercise program for stretching and relaxation  2. Patient will report >75% reduction in pain symptoms during sit to stand transition  3. Patient will report >/= 75% reduction in pain during initial 20' of gait following sitting  4. Patient will report daily pain symptoms to <2/10  Functional Limitations: carrying objects, lifting, pulling, pushing, reaching behind back and reaching overhead  Plan  Therapy options: will be seen for skilled physical therapy services  Planned modality interventions: cryotherapy, ultrasound, traction, TENS, thermotherapy (hydrocollator packs), high voltage pulsed current (pain management) and electrical stimulation/Russian stimulation  Planned therapy interventions: joint mobilization, IADL retraining, home exercise program, functional ROM exercises,  flexibility, body mechanics training, ADL retraining, abdominal trunk stabilization, manual therapy, motor coordination training, neuromuscular re-education, stretching, strengthening, spinal/joint mobilization, therapeutic activities, postural training and soft tissue mobilization  Frequency: 2x week  Duration in visits: 12  Treatment plan discussed with: patient  Plan details: Initial HEP was given on this date.        Manual Therapy:         mins  64951;  Therapeutic Exercise:         mins  81358;     Neuromuscular Khoa:        mins  25206;    Therapeutic Activity:          mins  05699;     Gait Training:          mins  16001;     Ultrasound:          mins  17057;    Electrical Stimulation:         mins  26082 ( );  Dry Needling          mins self-pay  Eval only  Timed Treatment:   45   mins   Total Treatment:     45   mins    PT SIGNATURE: Maurice Lomas PT DPT   [place] License # [id number]  DATE TREATMENT INITIATED: 2/5/2021    Initial Certification  Certification Period: 5/6/2021  I certify that the therapy services are furnished while this patient is under my care.  The services outlined above are required by this patient, and will be reviewed every 90 days.     PHYSICIAN:    ________________________________     DATE: ______________    Please sign and return via fax to [phone].. Thank you, [facility].

## 2021-02-09 ENCOUNTER — TREATMENT (OUTPATIENT)
Dept: PHYSICAL THERAPY | Facility: CLINIC | Age: 56
End: 2021-02-09

## 2021-02-09 DIAGNOSIS — M62.89 MUSCLE TIGHTNESS: ICD-10-CM

## 2021-02-09 DIAGNOSIS — N50.811 PAIN IN BOTH TESTICLES: Primary | ICD-10-CM

## 2021-02-09 DIAGNOSIS — N50.812 PAIN IN BOTH TESTICLES: Primary | ICD-10-CM

## 2021-02-09 PROCEDURE — 97110 THERAPEUTIC EXERCISES: CPT | Performed by: PHYSICAL THERAPIST

## 2021-02-09 PROCEDURE — 97530 THERAPEUTIC ACTIVITIES: CPT | Performed by: PHYSICAL THERAPIST

## 2021-02-09 PROCEDURE — 97140 MANUAL THERAPY 1/> REGIONS: CPT | Performed by: PHYSICAL THERAPIST

## 2021-02-09 NOTE — PROGRESS NOTES
Physical Therapy Daily Progress Note      Patient: Wyatt Sepulveda   : 1965  Referring practitioner: Julieth Gallardo DO  Date of Initial Visit: Type: THERAPY  Noted: 2021  Today's Date: 2021  Patient seen for 2 sessions    Progress Note Due:     Wyatt Sepulveda reports: that he felt good after last session, but his pain returned the next day. States that this week overall was a little better. Still waking every 2 hours at night to urinate.       Objective/ Treatment:   Onset of symptoms of deep palpation of R and L psoas and with deep palpation of the R transverse process at L1. No re-creation of pain with palpation of S2-4. Patient performing diaphragmatic breathing today with minimal verbal cues. Spinal rotation activity on right side modified to minimize discomfort at the shoulder. See manual therapy and exercise logs for more details.     Education: Pelvic floor anatomy/genitofemoral nerve pathway, fluid intake throughout the day to minimize nocturia, home exercise program      Assessment: The patient exhibited good response to manual therapy and exercise today, reporting 0/10 testicular pain while rising to stand from the plinth at the end of the session. Initiated spinal mobility exercises to improve mobility throughout the genitofemoral pathway. The patient continues to require skilled physical therapy to maintain improvements for longer so that the patient can transition from sitting to stand and walk without testicular pain.        Plan:  Progress per Plan of Care             Timed:         Manual Therapy:    18     mins  69543;     Therapeutic Exercise:   11      mins  74546;     Neuromuscular Khoa:        mins  39931;    Therapeutic Activity:    15      mins  74999;     Gait Training:           mins  02489;     Ultrasound:          mins  94887;    Ionto                                   mins   12707  Self Care                            mins   12778      Un-Timed:  Electrical Stimulation:          mins  54099 ( );  Dry Needling          mins self-pay  Traction          mins 44519  Low Eval          Mins  54669  Mod Eval          Mins  92253  High Eval                            Mins  33237  Canalith Repos                   mins  26185    Timed Treatment:  44    mins   Total Treatment:     45   mins    Lorna Garcia, PT  Physical Therapist

## 2021-02-18 ENCOUNTER — TELEPHONE (OUTPATIENT)
Dept: ORTHOPEDICS | Facility: OTHER | Age: 56
End: 2021-02-18

## 2021-03-02 ENCOUNTER — TREATMENT (OUTPATIENT)
Dept: PHYSICAL THERAPY | Facility: CLINIC | Age: 56
End: 2021-03-02

## 2021-03-02 DIAGNOSIS — M62.89 MUSCLE TIGHTNESS: ICD-10-CM

## 2021-03-02 DIAGNOSIS — N50.812 PAIN IN BOTH TESTICLES: Primary | ICD-10-CM

## 2021-03-02 DIAGNOSIS — N50.811 PAIN IN BOTH TESTICLES: Primary | ICD-10-CM

## 2021-03-02 PROCEDURE — 97140 MANUAL THERAPY 1/> REGIONS: CPT | Performed by: PHYSICAL THERAPIST

## 2021-03-02 PROCEDURE — 97110 THERAPEUTIC EXERCISES: CPT | Performed by: PHYSICAL THERAPIST

## 2021-03-02 NOTE — PROGRESS NOTES
"Physical Therapy Daily Progress Note      Patient: Wyatt Sepulveda   : 1965  Referring practitioner: Julieth Gallardo DO  Date of Initial Visit: Type: THERAPY  Noted: 2021  Today's Date: 3/2/2021  Patient seen for 3 sessions    Progress Note Due: 2021     Wyatt Sepulveda reports: that he's had a lot of neck pain after falling on ice 3x last week. Hit his head but did not want to go to the ER since that's where a lot of sick people are. Overall, feeling better after the falls. States that his testicular pain is much better since last week. Not as severe and not as frequent. He is not usually having pain when he stands up anymore, but occasionally in a nerve or tendon when he rolls over near his groin.      Objective/ Treatment: Deep palpation of L1 left transverse process refers pain to the testicle. No referred with palpation of either psoas today. Adductors are non painful bilaterally, but did not assess tendon due to patient comfort. Patient did state that during adductor stretch, it did recreate his \"nerve/tendon\" pain he was referring to earlier. Patient able to perform all exercises with verbal cues < 25% of the time. See manual therapy and exercise log for more details.     Education: Updated home exercise program, plan of care       Assessment: The patient continues to progress well towards his goals, reporting less pain with transition from sit to stand. The left upper lumbar spine continues to refer pain to the testicle, although bilateral psoas activity has decreased and is less painful. He continues to require skilled physical therapy to improve spinal mobility and muscle guarding to decrease testicular pain during activities of daily living such as transitional movements like rolling in bed.         Plan:  Progress per Plan of Care             Timed:         Manual Therapy:  13       mins  16066;     Therapeutic Exercise:  35       mins  23934;     Neuromuscular Khoa:        mins  00700;  "   Therapeutic Activity:          mins  33337;     Gait Training:           mins  01730;     Ultrasound:          mins  53871;    Ionto                                   mins   72950  Self Care                            mins   66219      Un-Timed:  Electrical Stimulation:         mins  71321 ( );  Dry Needling          mins self-pay  Traction          mins 40827  Low Eval          Mins  99059  Mod Eval          Mins  86260  High Eval                            Mins  20750  Canalith Repos                   mins  03035    Timed Treatment:  48    mins   Total Treatment:     49   mins    Lorna Garcia, PT  Physical Therapist

## 2021-03-09 ENCOUNTER — TREATMENT (OUTPATIENT)
Dept: PHYSICAL THERAPY | Facility: CLINIC | Age: 56
End: 2021-03-09

## 2021-03-09 DIAGNOSIS — N50.812 PAIN IN BOTH TESTICLES: Primary | ICD-10-CM

## 2021-03-09 DIAGNOSIS — N50.811 PAIN IN BOTH TESTICLES: Primary | ICD-10-CM

## 2021-03-09 DIAGNOSIS — M62.89 MUSCLE TIGHTNESS: ICD-10-CM

## 2021-03-09 PROCEDURE — 97110 THERAPEUTIC EXERCISES: CPT | Performed by: PHYSICAL THERAPIST

## 2021-03-09 PROCEDURE — 97140 MANUAL THERAPY 1/> REGIONS: CPT | Performed by: PHYSICAL THERAPIST

## 2021-03-09 RX ORDER — INSULIN GLARGINE 100 [IU]/ML
INJECTION, SOLUTION SUBCUTANEOUS
Qty: 15 ML | Refills: 0 | Status: SHIPPED | OUTPATIENT
Start: 2021-03-09 | End: 2021-03-11 | Stop reason: ALTCHOICE

## 2021-03-09 NOTE — PROGRESS NOTES
Physical Therapy Daily Progress Note      Patient: Wyatt Sepulveda   : 1965  Referring practitioner: Julieth Gallardo DO  Date of Initial Visit: Type: THERAPY  Noted: 2021  Today's Date: 3/9/2021  Patient seen for 4 sessions    Progress Note Due: 2021     Wyatt Sepulveda reports: that he felt pain for about 3 days since he was last here. States that it still occurs after sexual activity and is worse with transition from sit to stand following. Patient avoids sexual activity for the most part due to pain. No pain with sitting or standing. Notes he is 50-75% improved. States that he doesn't have a lot of time to do the exercises because he is so busy.    Pain  Current pain ratin  At worst pain ratin-9 (after intercourse)  Average pain rating: 3      Objective/ Treatment:    Palpation   Mild hypertonicity of (B) psoas without referral.     Active Range of Motion      Lumbar   Flexion: Active lumbar flexion: 75% to inferior shin.   Extension: Active lumbar extension: 75% to upper 1/3 posterior thigh.   Left lateral flexion: Active left lumbar lateral flexion: 75%   Right lateral flexion: Active right lumbar lateral flexion: 75%     Additional Active Range of Motion Details  No AROM of lumbar spine reproduced testicular pain    Hip flexion (B) reproduced testicular pain past 90 deg, Hip IR (B) reproduced testicular pain.      Passive Range of Motion     Additional Passive Range of Motion Details  Patients pain dissipated and resolved with oscillations to the lumbar region        Education: Progress towards goals, updated home exercise program, compliance with home exercise program, pelvic floor anatomy with visual     3 weeks. Pt will:  1 Patient will be (I) with home exercise program -achieved, but not compliant   2. Patient will report >50% reduction in pain symptoms during sit to stand transition -achieved   3. Patient will report >/= 50% reduction in pain during initial 20' of gait following sitting  -achieved   4. Patient will report daily pain symptoms to <5/10 -achieved     6 weeks. Pt will:  1 Patient will be (I) with long term exercise program for stretching and relaxation -progressing   2. Patient will report >75% reduction in pain symptoms during sit to stand transition -progressing   3. Patient will report >/= 75% reduction in pain during initial 20' of gait following sitting -progressing   4. Patient will report daily pain symptoms to <2/10 -progressing       Assessment: The patient has made good progress towards his goals. Overall, testicular pain has decreased in frequency. He denies pain referral with palpation of bilateral psoas. Hip flexion and internal rotation continues to exacerbate pain, as does palpation of the L1-2/2-3 transverse process on the left side. This is limiting his ability to perform transitional movements without pain. He would benefit from continued skilled physical therapy to address the stated deficits and return to his previous level of function.         Plan:  Progress per Plan of Care             Timed:         Manual Therapy:  19       mins  38444;     Therapeutic Exercise:   35      mins  43823;     Neuromuscular Khoa:        mins  72398;    Therapeutic Activity:          mins  97947;     Gait Training:           mins  16206;     Ultrasound:          mins  09509;    Ionto                                   mins   84023  Self Care                            mins   59502      Un-Timed:  Electrical Stimulation:         mins  94466 ( );  Dry Needling          mins self-pay  Traction          mins 36303  Low Eval          Mins  19491  Mod Eval          Mins  63479  High Eval                            Mins  48159  Canalith Repos                   mins  05239    Timed Treatment:  54    mins   Total Treatment:    55    mins    Lorna Garcia, PT  Physical Therapist

## 2021-03-12 ENCOUNTER — TELEPHONE (OUTPATIENT)
Dept: ENDOCRINOLOGY | Age: 56
End: 2021-03-12

## 2021-03-12 RX ORDER — SITAGLIPTIN AND METFORMIN HYDROCHLORIDE 1000; 50 MG/1; MG/1
2 TABLET, FILM COATED, EXTENDED RELEASE ORAL
Qty: 60 TABLET | Refills: 0 | Status: SHIPPED | OUTPATIENT
Start: 2021-03-12 | End: 2021-03-16 | Stop reason: SDUPTHER

## 2021-03-12 RX ORDER — INSULIN GLARGINE 100 [IU]/ML
INJECTION, SOLUTION SUBCUTANEOUS
Qty: 45 ML | Refills: 0 | Status: SHIPPED | OUTPATIENT
Start: 2021-03-12 | End: 2021-03-16 | Stop reason: SDUPTHER

## 2021-03-12 NOTE — TELEPHONE ENCOUNTER
Patient called in stated that insurance needs p/a for insulin and [employer] stated that he is out of medication request has been sent previous with no response

## 2021-03-15 DIAGNOSIS — Z79.4 TYPE 2 DIABETES MELLITUS WITH HYPERGLYCEMIA, WITH LONG-TERM CURRENT USE OF INSULIN (HCC): Primary | ICD-10-CM

## 2021-03-15 DIAGNOSIS — E78.5 ATHEROGENIC DYSLIPIDEMIA: ICD-10-CM

## 2021-03-15 DIAGNOSIS — E55.9 VITAMIN D DEFICIENCY: ICD-10-CM

## 2021-03-15 DIAGNOSIS — E11.65 TYPE 2 DIABETES MELLITUS WITH HYPERGLYCEMIA, WITH LONG-TERM CURRENT USE OF INSULIN (HCC): Primary | ICD-10-CM

## 2021-03-16 RX ORDER — INSULIN GLARGINE 100 [IU]/ML
INJECTION, SOLUTION SUBCUTANEOUS
Qty: 15 ML | Refills: 0 | Status: SHIPPED | OUTPATIENT
Start: 2021-03-16

## 2021-03-16 RX ORDER — SITAGLIPTIN AND METFORMIN HYDROCHLORIDE 1000; 50 MG/1; MG/1
2 TABLET, FILM COATED, EXTENDED RELEASE ORAL
Qty: 60 TABLET | Refills: 0 | Status: SHIPPED | OUTPATIENT
Start: 2021-03-16 | End: 2021-03-29

## 2021-03-17 ENCOUNTER — TELEPHONE (OUTPATIENT)
Dept: ENDOCRINOLOGY | Age: 56
End: 2021-03-17

## 2021-03-23 ENCOUNTER — TREATMENT (OUTPATIENT)
Dept: PHYSICAL THERAPY | Facility: CLINIC | Age: 56
End: 2021-03-23

## 2021-03-23 DIAGNOSIS — N50.811 PAIN IN BOTH TESTICLES: Primary | ICD-10-CM

## 2021-03-23 DIAGNOSIS — M62.89 MUSCLE TIGHTNESS: ICD-10-CM

## 2021-03-23 DIAGNOSIS — N50.812 PAIN IN BOTH TESTICLES: Primary | ICD-10-CM

## 2021-03-23 PROCEDURE — 97140 MANUAL THERAPY 1/> REGIONS: CPT | Performed by: PHYSICAL THERAPIST

## 2021-03-23 PROCEDURE — 97110 THERAPEUTIC EXERCISES: CPT | Performed by: PHYSICAL THERAPIST

## 2021-03-23 NOTE — PROGRESS NOTES
Physical Therapy Daily Progress Note      Patient: Wyatt Sepulveda   : 1965  Referring practitioner: Julieth Gallardo DO  Date of Initial Visit: Type: THERAPY  Noted: 2021  Today's Date: 3/23/2021  Patient seen for 5 sessions    Progress Note Due: 2021     Wyatt Sepulveda reports: that he had testicular pain for 3-4 days following sexual activity this past week. Sometimes it's left sided and sometimes right sided. Notes that the pain is mostly when he's transitioning from sitting to standing. States that he did a little bit, but not too much of stretching. States that he's not been taking his diabetes medication for 2 weeks because he wasn't able to get a refill. States that this has caused a lot of nervousness and anxiety.       Objective/ Treatment: Deep palpation of left psoas - refers mildly to left testicle. FABIR refers to testicle bilaterally. Passive and active left hip flexion refers to testicles. Palpation of left L1 TP refers mildly to left testicle.     Education: Pelvic floor anatomy and benefit of pelvic floor exam - patient agreeable to trying next session, compliance with stretching program      Assessment: The patient exhibited an increase in testicular pain the past two weeks. His pain may have been exacerbated by an increase in anxiety and therefor, an increase in pelvic floor muscle overactivity. The patient has agreed to undergo a pelvic floor examination next week to address specific pelvic floor musculature.        Plan:  Progress per Plan of Care             Timed:         Manual Therapy:   14      mins  49644;     Therapeutic Exercise:   27      mins  89593;     Neuromuscular Khoa:        mins  59031;    Therapeutic Activity:          mins  85588;     Gait Training:           mins  78124;     Ultrasound:          mins  24450;    Ionto                                   mins   03837  Self Care                      8      mins   65005      Un-Timed:  Electrical Stimulation:          mins  32509 ( );  Dry Needling          mins self-pay  Traction          mins 52389  Low Eval          Mins  74561  Mod Eval          Mins  42325  High Eval                            Mins  46261  Canalith Repos                   mins  77727    Timed Treatment:  49    mins   Total Treatment:    50    mins    Lorna Garcia, PT  Physical Therapist

## 2021-03-25 RX ORDER — BLOOD SUGAR DIAGNOSTIC
STRIP MISCELLANEOUS
Qty: 300 EACH | Refills: 0 | Status: SHIPPED | OUTPATIENT
Start: 2021-03-25

## 2021-03-25 RX ORDER — SITAGLIPTIN AND METFORMIN HYDROCHLORIDE 1000; 50 MG/1; MG/1
2 TABLET, FILM COATED, EXTENDED RELEASE ORAL
Qty: 60 TABLET | Refills: 0 | Status: CANCELLED | OUTPATIENT
Start: 2021-03-25

## 2021-03-26 ENCOUNTER — BULK ORDERING (OUTPATIENT)
Dept: CASE MANAGEMENT | Facility: OTHER | Age: 56
End: 2021-03-26

## 2021-03-26 DIAGNOSIS — Z23 IMMUNIZATION DUE: ICD-10-CM

## 2021-03-29 ENCOUNTER — TELEPHONE (OUTPATIENT)
Dept: ENDOCRINOLOGY | Age: 56
End: 2021-03-29

## 2021-03-29 RX ORDER — SITAGLIPTIN AND METFORMIN HYDROCHLORIDE 1000; 50 MG/1; MG/1
2 TABLET, FILM COATED, EXTENDED RELEASE ORAL
Qty: 180 TABLET | Refills: 0 | Status: SHIPPED | OUTPATIENT
Start: 2021-03-29

## 2021-03-30 ENCOUNTER — TREATMENT (OUTPATIENT)
Dept: PHYSICAL THERAPY | Facility: CLINIC | Age: 56
End: 2021-03-30

## 2021-03-30 DIAGNOSIS — M62.89 MUSCLE TIGHTNESS: ICD-10-CM

## 2021-03-30 DIAGNOSIS — N50.812 PAIN IN BOTH TESTICLES: Primary | ICD-10-CM

## 2021-03-30 DIAGNOSIS — N50.811 PAIN IN BOTH TESTICLES: Primary | ICD-10-CM

## 2021-03-30 PROCEDURE — 97110 THERAPEUTIC EXERCISES: CPT | Performed by: PHYSICAL THERAPIST

## 2021-03-30 PROCEDURE — 97535 SELF CARE MNGMENT TRAINING: CPT | Performed by: PHYSICAL THERAPIST

## 2021-03-30 PROCEDURE — 97140 MANUAL THERAPY 1/> REGIONS: CPT | Performed by: PHYSICAL THERAPIST

## 2021-03-30 NOTE — PROGRESS NOTES
Physical Therapy Daily Progress Note      Patient: Wyatt Sepulveda   : 1965  Referring practitioner: Julieth Gallardo DO  Date of Initial Visit: Type: THERAPY  Noted: 2021  Today's Date: 3/30/2021  Patient seen for 6 sessions    Progress Note Due: 2021     Wyatt Sepulveda reports: that he does not feel like he's made a huge improvement over the last month. Pain is intermittent. Patient reports noncompliance with home exercises, maybe doing them 1x/week.     Pain  Current pain ratin  At worst pain ratin  Average pain rating: 3-5       Objective/ Treatment:  The patient verbally provided ongoing and enthusiastic consent for the external pelvic floor assessment and treatment conducting during today's physical therapy session. He denied the need for a second person in the room.    Pelvic Floor Contraction: Present  Pelvic Floor Elongation: Normal    No pain with palpation of the muscles palpable through external examination.     Palpation   Mild hypertonicity of (L) psoas with referral to (L) testicle.      Active Range of Motion   Lumbar   Flexion: Active lumbar flexion: 85% to inferior shin (referral to B testicles on first 1-2 reps)   Extension: Active lumbar extension: 75% to upper 1/3 posterior thigh.   Left lateral flexion: Active left lumbar lateral flexion: 75%   Right lateral flexion: Active right lumbar lateral flexion: 75%     L hip IR reproduced testicular pain.      Passive Range of Motion   Additional Passive Range of Motion Details  (L) L1-2 oscillations refer to   Patients pain dissipated and resolved with oscillations to the lumbar region          Education: Progress towards goals, anatomy, benefit of pelvic floor exam and results, compliance with home exercise program, benefit of trigger point dry needling     3 weeks. Pt will:  1 Patient will be (I) with home exercise program -achieved, but not compliant   2. Patient will report >50% reduction in pain symptoms during sit to stand  transition -achieved   3. Patient will report >/= 50% reduction in pain during initial 20' of gait following sitting -achieved   4. Patient will report daily pain symptoms to <5/10 -achieved     6 weeks. Pt will:  1 Patient will be (I) with long term exercise program for stretching and relaxation -non compliant   2. Patient will report >75% reduction in pain symptoms during sit to stand transition -recently regressed  3. Patient will report >/= 75% reduction in pain during initial 20' of gait following sitting -recently regressed  4. Patient will report daily pain symptoms to <2/10 -progressing       Assessment: The patient initially made good progress towards his established goals. Within the last several weeks, the patient's progress has slowed. He is non compliant with his home exercise program but is compliant with attendance and rehab efforts within the clinic. Today a pelvic floor examination was performed to rule out pelvic floor dysfunction. The patient continues to exhibit signs and symptoms consistent with genitofemoral neuralgia, including pain with deep palpation of the left psoas and at the level of left L1-2 transverse process. Plan to trial trigger point dry needling within the next several sessions. If this does not improve the patient's pain, plan to refer back to the physician for consultation about nerve block.         Plan:  Progress per Plan of Care             Timed:         Manual Therapy:   9      mins  56878;     Therapeutic Exercise:   20      mins  29598;     Neuromuscular Khoa:        mins  29699;    Therapeutic Activity:          mins  51609;     Gait Training:           mins  16942;     Ultrasound:          mins  07291;    Ionto                                   mins   25240  Self Care                      19      mins   31200      Un-Timed:  Electrical Stimulation:         mins  50588 ( );  Dry Needling          mins self-pay  Traction          mins 97732  Low Eval          Mins   53155  Mod Eval          Mins  64694  High Eval                            Mins  57850  Canalith Repos                   mins  82351    Timed Treatment:  48    mins   Total Treatment:    51    mins    Lorna Garcia, PT  Physical Therapist

## 2021-04-13 ENCOUNTER — TELEPHONE (OUTPATIENT)
Dept: PHYSICAL THERAPY | Facility: CLINIC | Age: 56
End: 2021-04-13

## 2021-04-13 NOTE — TELEPHONE ENCOUNTER
PATIENT CALLED TO CANCEL [employer] INSURANCE MAY NOT COVER TODAY- WANTS TO SCHEDULE [facility] APPT INSTEAD

## 2021-04-27 ENCOUNTER — TREATMENT (OUTPATIENT)
Dept: PHYSICAL THERAPY | Facility: CLINIC | Age: 56
End: 2021-04-27

## 2021-04-27 DIAGNOSIS — N50.811 PAIN IN BOTH TESTICLES: Primary | ICD-10-CM

## 2021-04-27 DIAGNOSIS — M62.89 MUSCLE TIGHTNESS: ICD-10-CM

## 2021-04-27 DIAGNOSIS — N50.812 PAIN IN BOTH TESTICLES: Primary | ICD-10-CM

## 2021-04-27 PROCEDURE — DRYNDL PR CUSTOM DRY NEEDLING SELF PAY: Performed by: PHYSICAL THERAPIST

## 2021-04-27 NOTE — PROGRESS NOTES
Physical Therapy Initial Evaluation and Plan of Care        Patient: Wyatt Sepulveda   : 1965  Visit Diagnoses:     ICD-10-CM ICD-9-CM   1. Pain in both testicles  N50.811 608.9    N50.812    2. Muscle tightness  M62.89 728.9     Referring practitioner: No ref. provider found  Date of Initial Visit: 2021  Today's Date: 2021  Patient seen for 1 sessions               Subjective Evaluation    History of Present Illness  Mechanism of injury: Patient reports testicular pain for about 3 yrs.  Bilaterally, but primarily on the left.  Reports the pain is primarily triggered by a forward bending motion when initiating the transition from sitting to standing.  Reports he also has the pain when rolling over in bed.    Pain  At best pain ratin  At worst pain rating: 10  Relieving factors: rest  Aggravating factors: movement and lifting             Objective          Palpation   Left   Hypertonic in the adductor brevis, adductor longus, adductor chiquis and lumbar paraspinals.   Tenderness of the adductor brevis, adductor longus, adductor chiquis and lumbar paraspinals.     Right   Hypertonic in the adductor brevis, adductor longus and adductor chiquis. Tenderness of the adductor brevis, adductor longus and adductor chiquis.     Tenderness     Left Hip   Tenderness in the inguinal ligament and pubic tubercle.     Right Hip   Tenderness in the inguinal ligament and pubic tubercle.           Assessment & Plan     Assessment  Impairments: abnormal muscle tone and pain with function  Assessment details: Wyatt Sepulveda is a pleasant 56 y.o. male that presents with TTP and hypertonic lumbar and bilateral adductor muscles. Pt will benefit from skilled PT services in order to address soft tissue dysfunction, restore normalized muscle tone and minimize pain symptoms.      Functional Limitations: uncomfortable because of pain and moving in bed  Goals  Plan Goals:   1.  Obtain signed Dry Needling Waiver.  2.  Educate  patient on benefits and risks of Dry Needling Treatments.  3.  Minimize pain symptoms to allow for return to PLOF.      Plan  Planned modality interventions: thermotherapy (hydrocollator packs) and dry needling  Frequency: 1x week (PRN)  Treatment plan discussed with: patient  Plan details: Patient was educated on the procedure for dry needling and consent waver was signed. Patient was informed of the risks, possible adverse effects, along with the benefits of DN.             Timed:  Manual Therapy:         mins  17024;  Therapeutic Exercise:         mins  95354;     Neuromuscular Khoa:        mins  43421;    Therapeutic Activity:          mins  22808;     Gait Training:          mins  24872;     Ultrasound:          mins  16929;  Iontophoresis:          mins  76163;    Dry Needlin     mins;    Untimed:  Electrical Stimulation:         mins  56756 ( );  Mechanical Traction:         mins  91496;   Canalith Repositioning:        mins  96142;    Timed Treatment:   20   mins   Total Treatment:     30   mins    PT SIGNATURE: Annita Waldron PT   DATE TREATMENT INITIATED: 2021

## 2021-05-11 RX ORDER — INSULIN GLARGINE 100 [IU]/ML
INJECTION, SOLUTION SUBCUTANEOUS
Qty: 15 ML | Refills: 0 | Status: SHIPPED | OUTPATIENT
Start: 2021-05-11